# Patient Record
Sex: MALE | Race: WHITE | NOT HISPANIC OR LATINO | URBAN - METROPOLITAN AREA
[De-identification: names, ages, dates, MRNs, and addresses within clinical notes are randomized per-mention and may not be internally consistent; named-entity substitution may affect disease eponyms.]

---

## 2018-06-27 ENCOUNTER — IMPORTED ENCOUNTER (OUTPATIENT)
Dept: URBAN - METROPOLITAN AREA CLINIC 38 | Facility: CLINIC | Age: 68
End: 2018-06-27

## 2018-06-27 PROBLEM — H25.13 CATARACT (AGE RELATED) - NS (NUCLEAR) - OU: Noted: 2018-06-27

## 2018-06-27 PROCEDURE — 92015 DETERMINE REFRACTIVE STATE: CPT

## 2018-06-27 PROCEDURE — 92004 COMPRE OPH EXAM NEW PT 1/>: CPT

## 2018-12-18 PROBLEM — Z00.00 ENCOUNTER FOR PREVENTIVE HEALTH EXAMINATION: Noted: 2018-12-18

## 2019-01-16 PROBLEM — J45.909 ASTHMA, CHRONIC: Status: ACTIVE | Noted: 2019-01-16

## 2019-01-16 PROBLEM — Z63.4 WIDOWED: Status: ACTIVE | Noted: 2019-01-16

## 2019-01-16 NOTE — REVIEW OF SYSTEMS
[Shortness Of Breath] : shortness of breath [Dyspnea on exertion] : dyspnea during exertion [Joint Pain] : joint pain [Negative] : Heme/Lymph

## 2019-01-17 ENCOUNTER — NON-APPOINTMENT (OUTPATIENT)
Age: 69
End: 2019-01-17

## 2019-01-17 ENCOUNTER — APPOINTMENT (OUTPATIENT)
Dept: HEART AND VASCULAR | Facility: CLINIC | Age: 69
End: 2019-01-17
Payer: MEDICARE

## 2019-01-17 VITALS
HEART RATE: 67 BPM | OXYGEN SATURATION: 96 % | WEIGHT: 175 LBS | HEIGHT: 71 IN | DIASTOLIC BLOOD PRESSURE: 90 MMHG | SYSTOLIC BLOOD PRESSURE: 142 MMHG | BODY MASS INDEX: 24.5 KG/M2

## 2019-01-17 VITALS — SYSTOLIC BLOOD PRESSURE: 146 MMHG | DIASTOLIC BLOOD PRESSURE: 85 MMHG

## 2019-01-17 DIAGNOSIS — J45.909 UNSPECIFIED ASTHMA, UNCOMPLICATED: ICD-10-CM

## 2019-01-17 DIAGNOSIS — Z63.4 DISAPPEARANCE AND DEATH OF FAMILY MEMBER: ICD-10-CM

## 2019-01-17 PROCEDURE — 99213 OFFICE O/P EST LOW 20 MIN: CPT | Mod: 25

## 2019-01-17 PROCEDURE — 93000 ELECTROCARDIOGRAM COMPLETE: CPT

## 2019-01-17 RX ORDER — CHOLECALCIFEROL (VITAMIN D3) 125 MCG
50 MCG TABLET ORAL DAILY
Refills: 0 | Status: ACTIVE | COMMUNITY

## 2019-01-17 SDOH — SOCIAL STABILITY - SOCIAL INSECURITY: DISSAPEARANCE AND DEATH OF FAMILY MEMBER: Z63.4

## 2019-01-17 NOTE — PHYSICAL EXAM
[General Appearance - Well Developed] : well developed [Normal Appearance] : normal appearance [Well Groomed] : well groomed [General Appearance - Well Nourished] : well nourished [No Deformities] : no deformities [General Appearance - In No Acute Distress] : no acute distress [Normal Conjunctiva] : the conjunctiva exhibited no abnormalities [Eyelids - No Xanthelasma] : the eyelids demonstrated no xanthelasmas [Normal Oral Mucosa] : normal oral mucosa [No Oral Pallor] : no oral pallor [No Oral Cyanosis] : no oral cyanosis [Normal Jugular Venous A Waves Present] : normal jugular venous A waves present [Normal Jugular Venous V Waves Present] : normal jugular venous V waves present [No Jugular Venous Sultana A Waves] : no jugular venous sultana A waves [Respiration, Rhythm And Depth] : normal respiratory rhythm and effort [Exaggerated Use Of Accessory Muscles For Inspiration] : no accessory muscle use [Auscultation Breath Sounds / Voice Sounds] : lungs were clear to auscultation bilaterally [Normal Rate] : normal [Normal S1] : normal S1 [Normal S2] : normal S2 [No Murmur] : no murmurs heard [2+] : left 2+ [No Abnormalities] : the abdominal aorta was not enlarged and no bruit was heard [No Pitting Edema] : no pitting edema present [Abdomen Soft] : soft [Abdomen Tenderness] : non-tender [Abdomen Mass (___ Cm)] : no abdominal mass palpated [Abnormal Walk] : normal gait [Gait - Sufficient For Exercise Testing] : the gait was sufficient for exercise testing [Nail Clubbing] : no clubbing of the fingernails [Cyanosis, Localized] : no localized cyanosis [Petechial Hemorrhages (___cm)] : no petechial hemorrhages [Skin Color & Pigmentation] : normal skin color and pigmentation [] : no rash [No Venous Stasis] : no venous stasis [Skin Lesions] : no skin lesions [No Skin Ulcers] : no skin ulcer [No Xanthoma] : no  xanthoma was observed [Oriented To Time, Place, And Person] : oriented to person, place, and time [Affect] : the affect was normal [Mood] : the mood was normal [No Anxiety] : not feeling anxious [FreeTextEntry1] : + overweight [S3] : no S3 [S4] : no S4 [Right Carotid Bruit] : no bruit heard over the right carotid [Left Carotid Bruit] : no bruit heard over the left carotid [Right Femoral Bruit] : no bruit heard over the right femoral artery [Left Femoral Bruit] : no bruit heard over the left femoral artery

## 2019-01-17 NOTE — HISTORY OF PRESENT ILLNESS
[FreeTextEntry1] : Mr. Lainez presents for follow up and management of HTN and dyslipidemia. He denies any episodes of light headedness or syncope. His ECG reveals a right bundle branch block. He denies chest pain. He had an echocardiogram on 06/11/15 which revealed an EF of 57% and grade I diastolic dysfunction. He has a history of lumbar disc disease and underwent spinal surgery with francie insertion in '99 and now has complaints of low back pain and would like to see a spine specialist. He has complaints of progressive ROMAN and was evaluated by a pulmonologist at [facility] who ordered a chest CT which revealed a hiatal hernia but no obvious pulmonary parenchymal problem. He was asked to have a work up for a cardiac etiology of his ROMAN. He had an exercise stress echocardiogram on 10/12/16 which revealed an EF of 60% with normal wall motion and PA pressures post 7 METS of exercise but did reveal marked exertional dyspnea.  He was evaluated by a vascular surgeon, Dr. Restrepo, at [facility], and had a left lower extremity sonogram and was prescribed a compression stockings.  He moved out to [place], [place].  He denies recurrent CV symptoms.

## 2019-01-17 NOTE — REASON FOR VISIT
[Follow-Up - Clinic] : a clinic follow-up of [FreeTextEntry1] : Diagnostic Tests:\par ----------------------------------------------------\par ECG: \par 01/17/19: NSR, RBBB. \par 09/12/18: NSR, RBBB. \par 07/06/17: NSR, RBBB.\par 06/01/16: NSR, RBBB. \par 06/11/15: NSR, RBBB. \par 03/04/13: NSR, normal ECG.\par ----------------------------------------------------\par Echo: \par 09/27/18: EF 60%, normal echo. \par 06/11/15: EF 57%, grade I diastolic dysfunction, mild-moderate PI\par 07/22/08: EF 67%, LVH, trace MR/TR\par -----------------------------------------------------\par Stress: \par 10/12/16: exercise stress echo: EF 60%, normal wall motion and PA pressures. \par 11/23/15: exercise stress echo: EF 60%, normal wall motion and PA pressures. \par 09/29/09: exercise stress echo: EF 65%, normal wall motion and ECG post 13 METS\par -----------------------------------------------------\par Carotid:\par 11/23/15: sono; b/l prox ICA 1-19%.

## 2019-01-17 NOTE — ASSESSMENT
[FreeTextEntry1] :  \par 1. HTN: BP not at ACC/AHA 2017 guideline target, off medication this am\par             - continue candesartan 16mg po daily\par             - if BP remains above target next visit will up titrate anti-HTN regimen\par \par 2. TIA: in 2000, carotid artery stenosis, b/l prox ICA 1-19% (11/29/2015)\par             - continue aggressive medical therapy\par             - will follow with periodic sonograms. \par \par 3. Lumbar degenerative disc disease: Lumbar disc disease: s/p spinal surgery with francie insertion '99\par             - follow up with spine specialist Baron Vannessa MD. \par \par 4. Obstructive sleep apnea and asthma: moderate not on CPAP\par             - patient encouraged to meet with sleep specialist to discuss alternatives to traditional face mask CPAP\par             - discussed with patient importance of compliance with asthma medications. \par \par 5. Mild intermittent asthma, uncomplicated \par             - continue Montelukast Sodium Tablet, 10 MG, 1 tablet, Orally, Once a day, 90 days, 90\par             - continue Advair Diskus Aerosol Powder Breath Activated, 500-50 MCG/DOSE, 1 puff, Inhalation, Twice a day\par             - continue ProAir HFA Aerosol Solution, 108 (90 Base) MCG/ACT, 2 puffs as needed, Inhalation, every 4 hrs\par \par 6. Syncope: 2 episodes of near syncope:\par             - will send for an echocardiogram \par             - will order a mobile cardiac telemetry monitor (prefers patch)\par \par 7. Hypercholesteremia \par             - continue Crestor 40mg po daily\par             - discussed with patient therapeutic lifestyle changes to improve lipid metabolism.\par             - check lab work today\par \par 8. Symptomatic varicose veins: \par             - patient will provide a copy of LREV sono report from vascular surgeon.

## 2019-01-18 LAB
ALBUMIN SERPL ELPH-MCNC: 4.3 G/DL
ALP BLD-CCNC: 53 U/L
ALT SERPL-CCNC: 14 U/L
ANION GAP SERPL CALC-SCNC: 14 MMOL/L
AST SERPL-CCNC: 23 U/L
BASOPHILS # BLD AUTO: 0.06 K/UL
BASOPHILS NFR BLD AUTO: 0.9 %
BILIRUB SERPL-MCNC: 1 MG/DL
BUN SERPL-MCNC: 15 MG/DL
CALCIUM SERPL-MCNC: 9.4 MG/DL
CHLORIDE SERPL-SCNC: 102 MMOL/L
CHOLEST SERPL-MCNC: 135 MG/DL
CHOLEST/HDLC SERPL: 2.1 RATIO
CO2 SERPL-SCNC: 23 MMOL/L
CREAT SERPL-MCNC: 1.2 MG/DL
EOSINOPHIL # BLD AUTO: 0.26 K/UL
EOSINOPHIL NFR BLD AUTO: 4 %
GLUCOSE SERPL-MCNC: 75 MG/DL
HBA1C MFR BLD HPLC: 5.6 %
HCT VFR BLD CALC: 44.3 %
HDLC SERPL-MCNC: 64 MG/DL
HGB BLD-MCNC: 14.3 G/DL
IMM GRANULOCYTES NFR BLD AUTO: 0.2 %
LDLC SERPL CALC-MCNC: 54 MG/DL
LDLC SERPL DIRECT ASSAY-MCNC: 60 MG/DL
LYMPHOCYTES # BLD AUTO: 1.16 K/UL
LYMPHOCYTES NFR BLD AUTO: 18 %
MAN DIFF?: NORMAL
MCHC RBC-ENTMCNC: 30.7 PG
MCHC RBC-ENTMCNC: 32.3 GM/DL
MCV RBC AUTO: 95.1 FL
MONOCYTES # BLD AUTO: 0.47 K/UL
MONOCYTES NFR BLD AUTO: 7.3 %
NEUTROPHILS # BLD AUTO: 4.47 K/UL
NEUTROPHILS NFR BLD AUTO: 69.6 %
PLATELET # BLD AUTO: 197 K/UL
POTASSIUM SERPL-SCNC: 4.5 MMOL/L
PROT SERPL-MCNC: 6.9 G/DL
RBC # BLD: 4.66 M/UL
RBC # FLD: 14 %
SODIUM SERPL-SCNC: 139 MMOL/L
TRIGL SERPL-MCNC: 84 MG/DL
TSH SERPL-ACNC: 2.16 UIU/ML
WBC # FLD AUTO: 6.43 K/UL

## 2019-04-18 ENCOUNTER — APPOINTMENT (OUTPATIENT)
Dept: HEART AND VASCULAR | Facility: CLINIC | Age: 69
End: 2019-04-18
Payer: MEDICARE

## 2019-04-18 VITALS
TEMPERATURE: 97.6 F | WEIGHT: 184 LBS | HEART RATE: 73 BPM | HEIGHT: 71 IN | SYSTOLIC BLOOD PRESSURE: 122 MMHG | OXYGEN SATURATION: 97 % | DIASTOLIC BLOOD PRESSURE: 80 MMHG | BODY MASS INDEX: 25.76 KG/M2

## 2019-04-18 DIAGNOSIS — M87.00 IDIOPATHIC ASEPTIC NECROSIS OF UNSPECIFIED BONE: ICD-10-CM

## 2019-04-18 PROCEDURE — 99214 OFFICE O/P EST MOD 30 MIN: CPT

## 2019-04-18 NOTE — HISTORY OF PRESENT ILLNESS
[FreeTextEntry1] : Mr. Lainez presents for follow up and management of HTN, syncope, RBBB, and dyslipidemia. He denies any episodes of light headedness or syncope. His ECG reveals a right bundle branch block. He denies chest pain. He had an echocardiogram on 06/11/15 which revealed an EF of 57% and grade I diastolic dysfunction. He has a history of lumbar disc disease and underwent spinal surgery with francie insertion in '99 and now has complaints of low back pain and would like to see a spine specialist. He has complaints of progressive ROMAN and was evaluated by a pulmonologist at [facility] who ordered a chest CT which revealed a hiatal hernia but no obvious pulmonary parenchymal problem. He was asked to have a work up for a cardiac etiology of his ROMAN. He had an exercise stress echocardiogram on 10/12/16 which revealed an EF of 60% with normal wall motion and PA pressures post 7 METS of exercise but did reveal marked exertional dyspnea.  He was evaluated by a vascular surgeon, Dr. Restrepo, at [facility], and had a left lower extremity sonogram and was prescribed a compression stockings.  He moved out to [place], [place].  He denies recurrent CV symptoms. At present, he has joined a gym and is exercising regularly.

## 2019-04-18 NOTE — REVIEW OF SYSTEMS
[Shortness Of Breath] : shortness of breath [Dyspnea on exertion] : dyspnea during exertion [Joint Pain] : joint pain [Negative] : Endocrine

## 2019-04-18 NOTE — PHYSICAL EXAM
[General Appearance - Well Developed] : well developed [Well Groomed] : well groomed [Normal Appearance] : normal appearance [General Appearance - Well Nourished] : well nourished [No Deformities] : no deformities [General Appearance - In No Acute Distress] : no acute distress [Eyelids - No Xanthelasma] : the eyelids demonstrated no xanthelasmas [Normal Conjunctiva] : the conjunctiva exhibited no abnormalities [No Oral Pallor] : no oral pallor [Normal Oral Mucosa] : normal oral mucosa [No Oral Cyanosis] : no oral cyanosis [Normal Jugular Venous A Waves Present] : normal jugular venous A waves present [Normal Jugular Venous V Waves Present] : normal jugular venous V waves present [No Jugular Venous Sultana A Waves] : no jugular venous sultana A waves [Exaggerated Use Of Accessory Muscles For Inspiration] : no accessory muscle use [Respiration, Rhythm And Depth] : normal respiratory rhythm and effort [Auscultation Breath Sounds / Voice Sounds] : lungs were clear to auscultation bilaterally [Abdomen Soft] : soft [Abdomen Tenderness] : non-tender [Abdomen Mass (___ Cm)] : no abdominal mass palpated [Abnormal Walk] : normal gait [Gait - Sufficient For Exercise Testing] : the gait was sufficient for exercise testing [Cyanosis, Localized] : no localized cyanosis [Nail Clubbing] : no clubbing of the fingernails [Petechial Hemorrhages (___cm)] : no petechial hemorrhages [] : no rash [Skin Color & Pigmentation] : normal skin color and pigmentation [Skin Lesions] : no skin lesions [No Venous Stasis] : no venous stasis [No Skin Ulcers] : no skin ulcer [No Xanthoma] : no  xanthoma was observed [Affect] : the affect was normal [Oriented To Time, Place, And Person] : oriented to person, place, and time [No Anxiety] : not feeling anxious [Mood] : the mood was normal [Normal Rate] : normal [Normal S1] : normal S1 [Normal S2] : normal S2 [No Murmur] : no murmurs heard [2+] : left 2+ [No Pitting Edema] : no pitting edema present [No Abnormalities] : the abdominal aorta was not enlarged and no bruit was heard [FreeTextEntry1] : + overweight [S3] : no S3 [Right Carotid Bruit] : no bruit heard over the right carotid [S4] : no S4 [Left Carotid Bruit] : no bruit heard over the left carotid [Right Femoral Bruit] : no bruit heard over the right femoral artery [Left Femoral Bruit] : no bruit heard over the left femoral artery

## 2019-04-18 NOTE — ASSESSMENT
[FreeTextEntry1] :  \par 1. HTN: BP at ACC/AHA 2017 guideline target, off medication this am\par             - continue candesartan 16mg po daily\par \par 2. TIA: in 2000, carotid artery stenosis, b/l prox ICA 1-19% (11/29/2015)\par             - continue aggressive medical therapy\par             - will follow with periodic sonograms. \par \par 3. Lumbar degenerative disc disease: Lumbar disc disease: s/p spinal surgery with francie insertion '99\par             - follow up with spine specialist Baron Vannessa MD. \par \par 4. Obstructive sleep apnea and asthma: moderate not on CPAP\par             - patient encouraged to meet with sleep specialist to discuss alternatives to traditional face mask CPAP\par             - discussed with patient importance of compliance with asthma medications. \par \par 5. Mild intermittent asthma, uncomplicated \par             - continue Montelukast Sodium Tablet, 10 MG, 1 tablet, Orally, Once a day, 90 days, 90\par             - continue Advair Diskus, 500-50 MCG/DOSE, 1 puff, Inhalation, Twice a day\par             - continue ProAir HFA, 108 (90 Base) MCG/ACT, 2 puffs as needed, Inhalation, every 4 hrs\par \par 6. Syncope: 2 episodes of near syncope:\par             - will send for an echocardiogram \par             - will order a mobile cardiac telemetry monitor (prefers patch)\par \par 7. Hypercholesteremia: lipids optimal\par             - continue Crestor 40mg po daily\par             - discussed with patient therapeutic lifestyle changes to improve lipid metabolism.\par \par 8. Symptomatic varicose veins: \par             - patient will provide a copy of LREV sono report from vascular surgeon.

## 2019-04-18 NOTE — REASON FOR VISIT
[Follow-Up - Clinic] : a clinic follow-up of [FreeTextEntry1] : HTN, dyslipidemia, and right bundle branch block

## 2019-05-07 ENCOUNTER — MEDICATION RENEWAL (OUTPATIENT)
Age: 69
End: 2019-05-07

## 2019-06-26 ENCOUNTER — IMPORTED ENCOUNTER (OUTPATIENT)
Dept: URBAN - METROPOLITAN AREA CLINIC 38 | Facility: CLINIC | Age: 69
End: 2019-06-26

## 2019-06-26 PROBLEM — H25.13 CATARACT (AGE RELATED) - NS (NUCLEAR) - OU: Noted: 2019-06-26

## 2019-06-26 PROCEDURE — 92014 COMPRE OPH EXAM EST PT 1/>: CPT

## 2019-07-18 ENCOUNTER — APPOINTMENT (OUTPATIENT)
Dept: HEART AND VASCULAR | Facility: CLINIC | Age: 69
End: 2019-07-18

## 2019-09-10 ENCOUNTER — APPOINTMENT (OUTPATIENT)
Dept: HEART AND VASCULAR | Facility: CLINIC | Age: 69
End: 2019-09-10
Payer: MEDICARE

## 2019-09-10 VITALS
WEIGHT: 209 LBS | HEIGHT: 71 IN | DIASTOLIC BLOOD PRESSURE: 84 MMHG | OXYGEN SATURATION: 97 % | HEART RATE: 71 BPM | SYSTOLIC BLOOD PRESSURE: 134 MMHG | BODY MASS INDEX: 29.26 KG/M2

## 2019-09-10 PROCEDURE — 93306 TTE W/DOPPLER COMPLETE: CPT

## 2019-09-10 PROCEDURE — 99214 OFFICE O/P EST MOD 30 MIN: CPT | Mod: 25

## 2019-09-10 NOTE — HISTORY OF PRESENT ILLNESS
[FreeTextEntry1] : Mr. Lainez presents for follow up and management of HTN, syncope, RBBB, and dyslipidemia. He has a history of lumbar disc disease and underwent spinal surgery with francie insertion in '99  He had complaints of progressive ROMAN and was evaluated by a pulmonologist at [facility] who ordered a chest CT which revealed a hiatal hernia but no obvious pulmonary parenchymal problem. He was asked to have a work up for a cardiac etiology of his ROMAN. He had an exercise stress echocardiogram on 10/12/16 which revealed an EF of 60% with normal wall motion and PA pressures post 7 METS of exercise but did reveal marked exertional dyspnea.  He was evaluated by a vascular surgeon, Dr. Restrepo, at [facility], and had a left lower extremity sonogram and was prescribed a compression stockings.  He moved out to [place], [place].  He denies recurrent CV symptoms. He had an echocardiogram today (09/10/19) which revealed an EF of 69% and was a normal study.  At present, he has joined a gym and is exercising regularly. ETT has improved and has an exercise regimen.Denies any chest pain, SOB/ROMAN, palpitatio

## 2019-09-10 NOTE — REASON FOR VISIT
[Follow-Up - Clinic] : a clinic follow-up of [FreeTextEntry1] : Diagnostic Tests:\par ----------------------------------------------------\par ECG: \par 01/17/19: NSR, RBBB. \par 09/12/18: NSR, RBBB. \par 07/06/17: NSR, RBBB.\par 06/01/16: NSR, RBBB. \par 06/11/15: NSR, RBBB. \par 03/04/13: NSR, normal ECG.\par ----------------------------------------------------\par Echo: \par 09/10/19: EF 69%, normal echo. \par 09/27/18: EF 60%, normal echo. \par 06/11/15: EF 57%, grade I diastolic dysfunction, mild-moderate PI\par 07/22/08: EF 67%, LVH, trace MR/TR\par -----------------------------------------------------\par Stress: \par 10/12/16: exercise stress echo: EF 60%, normal wall motion and PA pressures. \par 11/23/15: exercise stress echo: EF 60%, normal wall motion and PA pressures. \par 09/29/09: exercise stress echo: EF 65%, normal wall motion and ECG post 13 METS\par -----------------------------------------------------\par Carotid:\par 11/23/15: sono; b/l prox ICA 1-19%.

## 2019-09-10 NOTE — REVIEW OF SYSTEMS
[Negative] : Heme/Lymph [Shortness Of Breath] : no shortness of breath [Dyspnea on exertion] : not dyspnea during exertion [Joint Pain] : no joint pain

## 2019-09-10 NOTE — PHYSICAL EXAM
[General Appearance - Well Developed] : well developed [Normal Appearance] : normal appearance [Well Groomed] : well groomed [General Appearance - Well Nourished] : well nourished [No Deformities] : no deformities [General Appearance - In No Acute Distress] : no acute distress [Normal Conjunctiva] : the conjunctiva exhibited no abnormalities [Eyelids - No Xanthelasma] : the eyelids demonstrated no xanthelasmas [Normal Oral Mucosa] : normal oral mucosa [No Oral Pallor] : no oral pallor [No Oral Cyanosis] : no oral cyanosis [Normal Jugular Venous A Waves Present] : normal jugular venous A waves present [Normal Jugular Venous V Waves Present] : normal jugular venous V waves present [No Jugular Venous Sultana A Waves] : no jugular venous sultana A waves [Respiration, Rhythm And Depth] : normal respiratory rhythm and effort [Exaggerated Use Of Accessory Muscles For Inspiration] : no accessory muscle use [Auscultation Breath Sounds / Voice Sounds] : lungs were clear to auscultation bilaterally [Abdomen Soft] : soft [Abdomen Tenderness] : non-tender [Abdomen Mass (___ Cm)] : no abdominal mass palpated [Abnormal Walk] : normal gait [Gait - Sufficient For Exercise Testing] : the gait was sufficient for exercise testing [Nail Clubbing] : no clubbing of the fingernails [Cyanosis, Localized] : no localized cyanosis [Petechial Hemorrhages (___cm)] : no petechial hemorrhages [Skin Color & Pigmentation] : normal skin color and pigmentation [] : no rash [No Venous Stasis] : no venous stasis [Skin Lesions] : no skin lesions [No Skin Ulcers] : no skin ulcer [No Xanthoma] : no  xanthoma was observed [Oriented To Time, Place, And Person] : oriented to person, place, and time [Affect] : the affect was normal [Mood] : the mood was normal [No Anxiety] : not feeling anxious [Normal Rate] : normal [Normal S1] : normal S1 [Normal S2] : normal S2 [No Murmur] : no murmurs heard [2+] : left 2+ [No Abnormalities] : the abdominal aorta was not enlarged and no bruit was heard [No Pitting Edema] : no pitting edema present [FreeTextEntry1] : + overweight [S3] : no S3 [S4] : no S4 [Right Carotid Bruit] : no bruit heard over the right carotid [Left Carotid Bruit] : no bruit heard over the left carotid [Right Femoral Bruit] : no bruit heard over the right femoral artery [Left Femoral Bruit] : no bruit heard over the left femoral artery

## 2019-09-10 NOTE — ASSESSMENT
[FreeTextEntry1] :  \par 1. HTN: BP at ACC/AHA 2017 guideline target, off medication this am\par             - continue candesartan 16mg po daily\par \par 2. TIA: in 2000, carotid artery stenosis, b/l prox ICA 1-19% (11/29/2015)\par             - continue aggressive medical therapy\par             - will follow with periodic sonograms. \par \par 3. Lumbar degenerative disc disease: Lumbar disc disease: s/p spinal surgery with francie insertion '99\par             - follow up with spine specialist Baron Vannessa MD. \par \par 4. Obstructive sleep apnea and asthma: moderate not on CPAP\par             - patient encouraged to meet with sleep specialist to discuss alternatives to traditional face mask CPAP\par             - discussed with patient importance of compliance with asthma medications. \par \par 5. Mild intermittent asthma, uncomplicated \par             - continue Montelukast Sodium Tablet, 10 MG, 1 tablet, Orally, Once a day, 90 days, 90\par             - continue Advair Diskus, 500-50 MCG/DOSE, 1 puff, Inhalation, Twice a day\par             - continue ProAir HFA, 108 (90 Base) MCG/ACT, 2 puffs as needed, Inhalation, every 4 hrs\par \par 6. Syncope: 2 episodes of near syncope:\par             - will send for an echocardiogram \par             - will order a mobile cardiac telemetry monitor (prefers patch)\par \par 7. Hypercholesteremia: lipids optimal\par             - continue Crestor 40mg po daily\par             - discussed with patient therapeutic lifestyle changes to improve lipid metabolism.\par \par 8. Symptomatic varicose veins: \par             - will send for b/l LEV sonogram\par \par 9. DVT: RLE: s/p spine surgery\par             - will send for b/l LEV sonogram

## 2019-09-19 ENCOUNTER — RX RENEWAL (OUTPATIENT)
Age: 69
End: 2019-09-19

## 2019-12-10 ENCOUNTER — APPOINTMENT (OUTPATIENT)
Dept: HEART AND VASCULAR | Facility: CLINIC | Age: 69
End: 2019-12-10
Payer: MEDICARE

## 2019-12-10 ENCOUNTER — NON-APPOINTMENT (OUTPATIENT)
Age: 69
End: 2019-12-10

## 2019-12-10 VITALS
DIASTOLIC BLOOD PRESSURE: 82 MMHG | BODY MASS INDEX: 29.96 KG/M2 | SYSTOLIC BLOOD PRESSURE: 131 MMHG | OXYGEN SATURATION: 96 % | HEIGHT: 71 IN | HEART RATE: 63 BPM | WEIGHT: 214 LBS

## 2019-12-10 PROCEDURE — 99214 OFFICE O/P EST MOD 30 MIN: CPT | Mod: 25

## 2019-12-10 PROCEDURE — 93970 EXTREMITY STUDY: CPT | Mod: 59

## 2019-12-10 PROCEDURE — 93000 ELECTROCARDIOGRAM COMPLETE: CPT

## 2019-12-10 RX ORDER — ALBUTEROL SULFATE 90 UG/1
108 (90 BASE) AEROSOL, METERED RESPIRATORY (INHALATION) EVERY 4 HOURS
Qty: 3 | Refills: 3 | Status: ACTIVE | COMMUNITY
Start: 1900-01-01 | End: 1900-01-01

## 2019-12-10 NOTE — REVIEW OF SYSTEMS
[Joint Pain] : no joint pain [Shortness Of Breath] : no shortness of breath [Dyspnea on exertion] : not dyspnea during exertion

## 2019-12-10 NOTE — REASON FOR VISIT
[FreeTextEntry1] : Diagnostic Tests:\par ----------------------------------------------------\par ECG: \par 12/10/19: sinus bradycardia at 59, RBBB, diffuse T-wave abnormality.\par 01/17/19: NSR, RBBB. \par 09/12/18: NSR, RBBB. \par 07/06/17: NSR, RBBB.\par 06/01/16: NSR, RBBB. \par 06/11/15: NSR, RBBB. \par 03/04/13: NSR, normal ECG.\par ----------------------------------------------------\par Echo: \par 09/10/19: EF 69%, normal echo. \par 09/27/18: EF 60%, normal echo. \par 06/11/15: EF 57%, grade I diastolic dysfunction, mild-moderate PI\par 07/22/08: EF 67%, LVH, trace MR/TR\par -----------------------------------------------------\par Stress: \par 10/12/16: exercise stress echo: EF 60%, normal wall motion and PA pressures. \par 11/23/15: exercise stress echo: EF 60%, normal wall motion and PA pressures. \par 09/29/09: exercise stress echo: EF 65%, normal wall motion and ECG post 13 METS\par -----------------------------------------------------\par Carotid:\par 11/23/15: sono; b/l prox ICA 1-19%.\par -----------------------------------------------------\par Lower extremity veins:\par 12/10/19: sono: + chronic nonocclusive DVT rPOP vein, + chronic nonocclusive SVT rPAV, + right varicose veins, left normal.

## 2019-12-10 NOTE — HISTORY OF PRESENT ILLNESS
[FreeTextEntry1] : Mr. Lainez presents for follow up and management of HTN, syncope, RBBB, and dyslipidemia. He has a history of lumbar disc disease and underwent spinal surgery with francie insertion in '99  He had complaints of progressive ROMAN and was evaluated by a pulmonologist at [facility] who ordered a chest CT which revealed a hiatal hernia but no obvious pulmonary parenchymal problem. He was asked to have a work up for a cardiac etiology of his ROMAN. He had an exercise stress echocardiogram on 10/12/16 which revealed an EF of 60% with normal wall motion and PA pressures post 7 METS of exercise but did reveal marked exertional dyspnea.  He was evaluated by a vascular surgeon, Dr. Restrepo, at [facility], and had a left lower extremity sonogram and was prescribed a compression stockings.  He moved out to [place], [place].  He denies recurrent CV symptoms. He had an echocardiogram on 09/10/19 which revealed an EF of 69% and was a normal study.  At present, he has joined a gym and is exercising regularly but we discussed the importance of adding cardiovascular exercise to his regimen.

## 2019-12-10 NOTE — ASSESSMENT
[FreeTextEntry1] : 1. HTN: BP at ACC/AHA 2017 guideline target\par             - continue candesartan 16mg po daily\par \par 2. TIA: in 2000, carotid artery stenosis, b/l prox ICA 1-19% (11/29/2015)\par             - continue aggressive medical therapy\par             - will follow with periodic sonograms. \par \par 3. Lumbar degenerative disc disease: Lumbar disc disease: s/p spinal surgery with francie insertion '99\par             - follow up with spine specialist Baron Vannessa MD. \par \par 4. Obstructive sleep apnea and asthma: moderate not on CPAP\par             - patient encouraged to meet with sleep specialist to discuss alternatives to traditional face mask CPAP\par             - discussed with patient importance of compliance with asthma medications. \par \par 5. Mild intermittent asthma, uncomplicated \par             - continue Montelukast Sodium Tablet, 10 MG, 1 tablet, Orally, Once a day, 90 days, 90\par             - continue Advair Diskus, 500-50 MCG/DOSE, 1 puff, Inhalation, Twice a day\par             - continue ProAir HFA, 108 (90 Base) MCG/ACT, 2 puffs as needed, Inhalation, every 4 hrs\par \par 6. Syncope: 2 episodes of near syncope:\par             - will send for an echocardiogram \par             - will order a mobile cardiac telemetry monitor (prefers patch)\par \par 7. Hypercholesteremia: lipids optimal\par             - continue Crestor 40mg po daily\par             - discussed with patient therapeutic lifestyle changes to improve lipid metabolism.\par \par 8. DVT s/p spinal surgery in the past, symptomatic varicose veins: s/p 12/10/19: sono: + chronic nonocclusive DVT rPOP vein, + chronic nonocclusive SVT rPAV, + right varicose veins, left normal. \par             - will follow with serial sonograms\par             - continue preventive measures\par \par 9. + Weight gain: \par           -we had an extensive discussion about therapeutic lifestyle changes to promote increased cardiovascular fitness and achieving goal weight.\par

## 2019-12-10 NOTE — PHYSICAL EXAM
[FreeTextEntry1] : + overweight [S4] : no S4 [Right Carotid Bruit] : no bruit heard over the right carotid [S3] : no S3 [Left Carotid Bruit] : no bruit heard over the left carotid [Right Femoral Bruit] : no bruit heard over the right femoral artery [Left Femoral Bruit] : no bruit heard over the left femoral artery

## 2019-12-11 LAB
24R-OH-CALCIDIOL SERPL-MCNC: 47.2 PG/ML
ALBUMIN SERPL ELPH-MCNC: 4.1 G/DL
ALP BLD-CCNC: 58 U/L
ALT SERPL-CCNC: 13 U/L
ANION GAP SERPL CALC-SCNC: 11 MMOL/L
AST SERPL-CCNC: 14 U/L
BASOPHILS # BLD AUTO: 0.06 K/UL
BASOPHILS NFR BLD AUTO: 0.9 %
BILIRUB SERPL-MCNC: 0.7 MG/DL
BUN SERPL-MCNC: 17 MG/DL
CALCIUM SERPL-MCNC: 9.3 MG/DL
CHLORIDE SERPL-SCNC: 105 MMOL/L
CHOLEST SERPL-MCNC: 148 MG/DL
CHOLEST/HDLC SERPL: 2 RATIO
CO2 SERPL-SCNC: 25 MMOL/L
CREAT SERPL-MCNC: 1.21 MG/DL
EOSINOPHIL # BLD AUTO: 0.19 K/UL
EOSINOPHIL NFR BLD AUTO: 3 %
ESTIMATED AVERAGE GLUCOSE: 111 MG/DL
GLUCOSE SERPL-MCNC: 109 MG/DL
HBA1C MFR BLD HPLC: 5.5 %
HCT VFR BLD CALC: 43.9 %
HDLC SERPL-MCNC: 73 MG/DL
HGB BLD-MCNC: 14.2 G/DL
IMM GRANULOCYTES NFR BLD AUTO: 0.3 %
LDLC SERPL CALC-MCNC: 54 MG/DL
LDLC SERPL DIRECT ASSAY-MCNC: 67 MG/DL
LYMPHOCYTES # BLD AUTO: 1.1 K/UL
LYMPHOCYTES NFR BLD AUTO: 17.4 %
MAN DIFF?: NORMAL
MCHC RBC-ENTMCNC: 31.1 PG
MCHC RBC-ENTMCNC: 32.3 GM/DL
MCV RBC AUTO: 96.3 FL
MONOCYTES # BLD AUTO: 0.48 K/UL
MONOCYTES NFR BLD AUTO: 7.6 %
NEUTROPHILS # BLD AUTO: 4.49 K/UL
NEUTROPHILS NFR BLD AUTO: 70.8 %
PLATELET # BLD AUTO: 227 K/UL
POTASSIUM SERPL-SCNC: 4.3 MMOL/L
PROT SERPL-MCNC: 6.5 G/DL
RBC # BLD: 4.56 M/UL
RBC # FLD: 13.1 %
SODIUM SERPL-SCNC: 141 MMOL/L
TRIGL SERPL-MCNC: 106 MG/DL
TSH SERPL-ACNC: 1.93 UIU/ML
WBC # FLD AUTO: 6.34 K/UL

## 2020-04-07 ENCOUNTER — APPOINTMENT (OUTPATIENT)
Dept: HEART AND VASCULAR | Facility: CLINIC | Age: 70
End: 2020-04-07

## 2020-08-12 ENCOUNTER — IMPORTED ENCOUNTER (OUTPATIENT)
Dept: URBAN - METROPOLITAN AREA CLINIC 38 | Facility: CLINIC | Age: 70
End: 2020-08-12

## 2020-08-12 PROBLEM — H25.13 CATARACT (AGE RELATED) - NS (NUCLEAR) - OU: Noted: 2020-08-12

## 2020-08-12 PROCEDURE — 92014 COMPRE OPH EXAM EST PT 1/>: CPT

## 2020-09-22 ENCOUNTER — RX RENEWAL (OUTPATIENT)
Age: 70
End: 2020-09-22

## 2020-12-14 ENCOUNTER — RX RENEWAL (OUTPATIENT)
Age: 70
End: 2020-12-14

## 2020-12-17 ENCOUNTER — APPOINTMENT (OUTPATIENT)
Dept: HEART AND VASCULAR | Facility: CLINIC | Age: 70
End: 2020-12-17
Payer: MEDICARE

## 2020-12-17 VITALS
SYSTOLIC BLOOD PRESSURE: 162 MMHG | WEIGHT: 214 LBS | BODY MASS INDEX: 29.96 KG/M2 | DIASTOLIC BLOOD PRESSURE: 82 MMHG | HEIGHT: 71 IN | HEART RATE: 85 BPM

## 2020-12-17 PROCEDURE — 99213 OFFICE O/P EST LOW 20 MIN: CPT

## 2020-12-17 NOTE — ASSESSMENT
[FreeTextEntry1] : 1. HTN: BP not at ACC/AHA 2017 guideline target\par             - continue candesartan 16mg po daily\par             - if BP remains above target next visit will up titrate anti-HTN regimen \par \par 2. TIA: in 2000, carotid artery stenosis, b/l prox ICA 1-19% (11/29/2015)\par             - continue aggressive medical therapy\par             - will follow with periodic sonograms. \par \par 3. Lumbar degenerative disc disease: Lumbar disc disease: s/p spinal surgery with francie insertion '99\par             - follow up with spine specialist Baron Vannessa MD \par \par 4. Obstructive sleep apnea and asthma: moderate not on CPAP\par             - patient encouraged to meet with sleep specialist to discuss alternatives to traditional face mask CPAP\par             - discussed with patient importance of compliance with asthma medications. \par \par 5. Mild intermittent asthma, uncomplicated \par             - continue Montelukast Sodium Tablet, 10 MG, 1 tablet, Orally, Once a day, 90 days, 90\par             - continue Advair Diskus, 500-50 MCG/DOSE, 1 puff, Inhalation, Twice a day\par             - continue ProAir HFA, 108 (90 Base) MCG/ACT, 2 puffs as needed, Inhalation, every 4 hrs\par \par 6. Syncope: 2 episodes of near syncope:\par             - will send for an echocardiogram \par             - will order a mobile cardiac telemetry monitor (prefers patch)\par \par 7. Hypercholesteremia: lipids optimal\par             - continue Crestor 40mg po daily\par             - discussed with patient therapeutic lifestyle changes to improve lipid metabolism\par             - patient will provide copy of recent lab work from PMD's office for my review.\par \par 8. DVT s/p spinal surgery in the past, symptomatic varicose veins: s/p 12/10/19: sono: + chronic nonocclusive DVT rPOP vein, + chronic nonocclusive SVT rPAV, + right varicose veins, left normal. \par             - will follow with serial sonograms\par             - continue preventive measures\par \par

## 2020-12-17 NOTE — HISTORY OF PRESENT ILLNESS
[FreeTextEntry1] : Mr. Lainez presents for follow up and management of HTN, syncope, RBBB, and dyslipidemia. He has a history of lumbar disc disease and underwent spinal surgery with francie insertion in '99  He had complaints of progressive ROMAN and was evaluated by a pulmonologist at [facility] who ordered a chest CT which revealed a hiatal hernia but no obvious pulmonary parenchymal problem. He was asked to have a work up for a cardiac etiology of his ROMAN. He had an exercise stress echocardiogram on 10/12/16 which revealed an EF of 60% with normal wall motion and PA pressures post 7 METS of exercise but did reveal marked exertional dyspnea.  He was evaluated by a vascular surgeon, Dr. Restrepo, at [facility], and had a left lower extremity sonogram and was prescribed a compression stockings.  He moved out to [place], [place].  He had an echocardiogram on 09/10/19 which revealed an EF of 69% and was a normal study.   On 05/07/20 he was admitted to a hospital in [place] with acute cholecystitis and underwent robotic assisted cholecystectomy.  At present, he is doing well and denies any cardiovascular complaints.

## 2020-12-17 NOTE — REVIEW OF SYSTEMS
[Shortness Of Breath] : no shortness of breath [Dyspnea on exertion] : not dyspnea during exertion [Joint Pain] : no joint pain

## 2021-02-25 ENCOUNTER — APPOINTMENT (OUTPATIENT)
Dept: HEART AND VASCULAR | Facility: CLINIC | Age: 71
End: 2021-02-25
Payer: MEDICARE

## 2021-02-25 VITALS
SYSTOLIC BLOOD PRESSURE: 166 MMHG | WEIGHT: 214 LBS | HEART RATE: 82 BPM | HEIGHT: 71 IN | BODY MASS INDEX: 29.96 KG/M2 | DIASTOLIC BLOOD PRESSURE: 92 MMHG

## 2021-02-25 DIAGNOSIS — N40.0 BENIGN PROSTATIC HYPERPLASIA WITHOUT LOWER URINARY TRACT SYMPMS: ICD-10-CM

## 2021-02-25 PROCEDURE — 99215 OFFICE O/P EST HI 40 MIN: CPT

## 2021-02-25 PROCEDURE — 99072 ADDL SUPL MATRL&STAF TM PHE: CPT

## 2021-02-25 NOTE — REASON FOR VISIT
[Follow-Up - Clinic] : a clinic follow-up of [FreeTextEntry1] : Diagnostic Tests:\par ----------------------------------------------------\par ECG: \par 12/10/19: sinus bradycardia at 59, RBBB, diffuse T-wave abnormality.\par 01/17/19: NSR, RBBB. \par 09/12/18: NSR, RBBB. \par 07/06/17: NSR, RBBB.\par 06/01/16: NSR, RBBB. \par 06/11/15: NSR, RBBB. \par 03/04/13: NSR, normal ECG.\par ----------------------------------------------------\par Echo: \par 09/10/19: EF 69%, normal echo. \par 09/27/18: EF 60%, normal echo. \par 06/11/15: EF 57%, grade I diastolic dysfunction, mild-moderate PI\par 07/22/08: EF 67%, LVH, trace MR/TR\par -----------------------------------------------------\par Stress: \par 10/12/16: exercise stress echo: EF 60%, normal wall motion and PA pressures. \par 11/23/15: exercise stress echo: EF 60%, normal wall motion and PA pressures. \par 09/29/09: exercise stress echo: EF 65%, normal wall motion and ECG post 13 METS\par -----------------------------------------------------\par Carotid:\par 11/23/15: sono; b/l prox ICA 1-19%.\par -----------------------------------------------------\par Lower extremity veins:\par 12/10/19: sono: + chronic nonocclusive DVT rPOP vein, + chronic nonocclusive SVT rPAV, + right varicose veins, left normal.

## 2021-02-25 NOTE — ASSESSMENT
[FreeTextEntry1] : 1. HTN: BP not at ACC/AHA 2017 guideline target: off medication this am\par             - continue candesartan 16mg po daily (will switch to the am)\par             - if BP remains above target next visit will up titrate anti-HTN regimen\par             - continue ambulatory BP log \par             - will send for an echocardiogram to r/o hypertensive heart disease\par \par 2. TIA: in 2000, carotid artery stenosis, b/l prox ICA 1-19% (11/29/2015)\par             - continue aggressive medical therapy\par             - will follow with periodic sonograms. \par \par 3. Lumbar degenerative disc disease: Lumbar disc disease: s/p spinal surgery with francie insertion '99\par             - follow up with spine specialist Baron Vannessa MD \par \par 4. Obstructive sleep apnea and asthma: moderate not on CPAP\par             - patient encouraged to meet with sleep specialist to discuss alternatives to traditional face mask CPAP\par             - discussed with patient importance of compliance with asthma medications. \par \par 5. Mild intermittent asthma, uncomplicated \par             - continue Montelukast Sodium Tablet, 10 MG, 1 tablet, Orally, Once a day, 90 days, 90\par             - continue Advair Diskus, 500-50 MCG/DOSE, 1 puff, Inhalation, Twice a day\par             - continue ProAir HFA, 108 (90 Base) MCG/ACT, 2 puffs as needed, Inhalation, every 4 hrs\par \par 6. Syncope: 2 episodes of near syncope:\par             - will send for an echocardiogram \par             - will order a mobile cardiac telemetry monitor (prefers patch)\par \par 7. Hypercholesteremia: lipids optimal\par             - continue Crestor 40mg po daily\par             - discussed with patient therapeutic lifestyle changes to improve lipid metabolism\par             - patient will provide copy of recent lab work from PMD's office for my review.\par \par 8. DVT s/p spinal surgery in the past, symptomatic varicose veins: s/p 12/10/19: sono: + chronic nonocclusive DVT rPOP vein, + chronic nonocclusive SVT rPAV, + right varicose veins, left normal. \par             - will follow with serial sonograms (ordered today) \par             - continue preventive measures\par \par \par Due to the current global COVID-19 pandemic and the recommendations for social distancing this encounter was converted from an in-person face-to-face encounter to a telehealth encounter employing the [employer], or other approved audio/video platform.  All components of the evaluation and management were performed per clinical routine with the exception of the physical exam.  The physical exam references my most recent physical exam plus any additional information provided by the patient (i.e. ambulatory vitals/weight) or inspection from the video portion of the encounter. \par \par I spent in excess of 40 minutes on the encounter.  \par \par Verbal consent was given on 02/25/21 by Juancho Lainez. \par \par Patient location: The patient was located at the primary address listed in the medical record.\par Physician location: I was located in my office in [facility]. \par \par

## 2021-02-25 NOTE — HISTORY OF PRESENT ILLNESS
[FreeTextEntry1] : Mr. Lainez presents for follow up and management of HTN, syncope, RBBB, and dyslipidemia. He has a history of lumbar disc disease and underwent spinal surgery with francie insertion in '99  He had complaints of progressive ROMAN and was evaluated by a pulmonologist at [facility] who ordered a chest CT which revealed a hiatal hernia but no obvious pulmonary parenchymal problem. He was asked to have a work up for a cardiac etiology of his ROMAN. He had an exercise stress echocardiogram on 10/12/16 which revealed an EF of 60% with normal wall motion and PA pressures post 7 METS of exercise but did reveal marked exertional dyspnea.  He was evaluated by a vascular surgeon, Dr. Restrepo, at [facility], and had a left lower extremity sonogram and was prescribed a compression stockings.  He moved out to [place], [place].  He had an echocardiogram on 09/10/19 which revealed an EF of 69% and was a normal study.   On 05/07/20 he was admitted to a hospital in [place] with acute cholecystitis and underwent robotic assisted cholecystectomy.  At present, he is doing well and denies any cardiovascular complaints.

## 2021-03-04 ENCOUNTER — APPOINTMENT (OUTPATIENT)
Dept: HEART AND VASCULAR | Facility: CLINIC | Age: 71
End: 2021-03-04
Payer: MEDICARE

## 2021-03-04 ENCOUNTER — NON-APPOINTMENT (OUTPATIENT)
Age: 71
End: 2021-03-04

## 2021-03-04 VITALS
BODY MASS INDEX: 29.4 KG/M2 | HEIGHT: 71 IN | OXYGEN SATURATION: 97 % | HEART RATE: 67 BPM | SYSTOLIC BLOOD PRESSURE: 135 MMHG | TEMPERATURE: 97.5 F | DIASTOLIC BLOOD PRESSURE: 74 MMHG | WEIGHT: 210 LBS

## 2021-03-04 PROCEDURE — 93306 TTE W/DOPPLER COMPLETE: CPT | Mod: 59

## 2021-03-04 PROCEDURE — 99072 ADDL SUPL MATRL&STAF TM PHE: CPT

## 2021-03-04 PROCEDURE — 36415 COLL VENOUS BLD VENIPUNCTURE: CPT

## 2021-03-04 PROCEDURE — 93970 EXTREMITY STUDY: CPT | Mod: 59

## 2021-03-04 PROCEDURE — 93000 ELECTROCARDIOGRAM COMPLETE: CPT

## 2021-03-04 PROCEDURE — 99215 OFFICE O/P EST HI 40 MIN: CPT | Mod: 25

## 2021-03-04 NOTE — ASSESSMENT
[FreeTextEntry1] : 1. HTN: BP at ACC/AHA 2017 guideline target: off medication this am\par             - continue candesartan 16mg po daily \par             - continue ambulatory BP log \par \par 2. TIA: in 2000, carotid artery stenosis, b/l prox ICA 1-19% (11/29/2015)\par             - continue aggressive medical therapy\par             - will follow with periodic sonograms. \par \par 3. Lumbar degenerative disc disease: Lumbar disc disease: s/p spinal surgery with francie insertion '99\par             - follow up with spine specialist Baron Vannessa MD \par \par 4. Obstructive sleep apnea and asthma: moderate not on CPAP\par             - patient encouraged to meet with sleep specialist to discuss alternatives to traditional face mask CPAP\par             - discussed with patient importance of compliance with asthma medications. \par \par 5. Mild intermittent asthma, uncomplicated \par             - continue Montelukast Sodium Tablet, 10 MG, 1 tablet, Orally, Once a day, 90 days, 90\par             - continue Advair Diskus, 500-50 MCG/DOSE, 1 puff, Inhalation, Twice a day\par             - continue ProAir HFA, 108 (90 Base) MCG/ACT, 2 puffs as needed, Inhalation, every 4 hrs\par \par 6. Syncope: 2 episodes of near syncope:\par             - will send for an echocardiogram \par             - will order a mobile cardiac telemetry monitor (prefers patch)\par \par 7. Hypercholesteremia: lipids optimal\par             - continue Crestor 40mg po daily\par             - discussed with patient therapeutic lifestyle changes to improve lipid metabolism\par             - check lab work today\par \par 8. DVT s/p spinal surgery in the past, symptomatic varicose veins: s/p 03/04/21: sono: + chronic nonocclusive DVT rPOP vein. \par             - will follow with serial sonogram\par             - continue preventive measures\par \par \par  \par \par

## 2021-03-04 NOTE — HISTORY OF PRESENT ILLNESS
[FreeTextEntry1] : Mr. Lainez presents for follow up and management of HTN, syncope, RBBB, and dyslipidemia. He has a history of lumbar disc disease and underwent spinal surgery with francie insertion in '99  He had complaints of progressive ROMAN and was evaluated by a pulmonologist at [facility] who ordered a chest CT which revealed a hiatal hernia but no obvious pulmonary parenchymal problem. He was asked to have a work up for a cardiac etiology of his ROMAN. He had an exercise stress echocardiogram on 10/12/16 which revealed an EF of 60% with normal wall motion and PA pressures post 7 METS of exercise but did reveal marked exertional dyspnea.  He was evaluated by a vascular surgeon, Dr. Restrepo, at [facility], and had a left lower extremity sonogram and was prescribed a compression stockings.  He moved out to [place], [place].  He had an echocardiogram on 09/10/19 which revealed an EF of 69% and was a normal study.   On 05/07/20 he was admitted to a hospital in [place] with acute cholecystitis and underwent robotic assisted cholecystectomy.  Today, 03/04/21, he had an echocardiogram which revealed an EF of 69% and was a normal study.  Today, 03/04/21, he had bilateral lower extremity venous sonography which revealed chronic nonocclusive DVT of the right popliteal vein.   At present, he is doing well and denies any cardiovascular complaints.

## 2021-03-04 NOTE — REASON FOR VISIT
[Follow-Up - Clinic] : a clinic follow-up of [FreeTextEntry1] : Diagnostic Tests:\par ----------------------------------------------------\par ECG: \par 03/04/21: NSR, RBBB. \par 12/10/19: sinus bradycardia at 59, RBBB, diffuse T-wave abnormality.\par 01/17/19: NSR, RBBB. \par 09/12/18: NSR, RBBB. \par 07/06/17: NSR, RBBB.\par 06/01/16: NSR, RBBB. \par 06/11/15: NSR, RBBB. \par 03/04/13: NSR, normal ECG.\par ----------------------------------------------------\par Echo: \par 03/04/21: EF 69%, normal study, contrast employed. \par 09/10/19: EF 69%, normal echo. \par 09/27/18: EF 60%, normal echo. \par 06/11/15: EF 57%, grade I diastolic dysfunction, mild-moderate PI\par 07/22/08: EF 67%, LVH, trace MR/TR\par -----------------------------------------------------\par Stress: \par 10/12/16: exercise stress echo: EF 60%, normal wall motion and PA pressures. \par 11/23/15: exercise stress echo: EF 60%, normal wall motion and PA pressures. \par 09/29/09: exercise stress echo: EF 65%, normal wall motion and ECG post 13 METS\par -----------------------------------------------------\par Carotid:\par 11/23/15: sono; b/l prox ICA 1-19%.\par -----------------------------------------------------\par Lower extremity veins:\par 03/04/21: sono: + chronic nonocclusive DVT rPOP vein, rGSV not visualized (ablated),  left normal. \par 12/10/19: sono: + chronic nonocclusive DVT rPOP vein, + chronic nonocclusive SVT rPAV, + right varicose veins, left normal.

## 2021-03-05 LAB
ALBUMIN SERPL ELPH-MCNC: 4.5 G/DL
ALP BLD-CCNC: 75 U/L
ALT SERPL-CCNC: 12 U/L
ANION GAP SERPL CALC-SCNC: 15 MMOL/L
AST SERPL-CCNC: 16 U/L
BASOPHILS # BLD AUTO: 0.05 K/UL
BASOPHILS NFR BLD AUTO: 0.7 %
BILIRUB SERPL-MCNC: 1 MG/DL
BUN SERPL-MCNC: 19 MG/DL
CALCIUM SERPL-MCNC: 9.6 MG/DL
CHLORIDE SERPL-SCNC: 104 MMOL/L
CHOLEST SERPL-MCNC: 133 MG/DL
CO2 SERPL-SCNC: 25 MMOL/L
CREAT SERPL-MCNC: 1.57 MG/DL
EOSINOPHIL # BLD AUTO: 0.14 K/UL
EOSINOPHIL NFR BLD AUTO: 1.9 %
ESTIMATED AVERAGE GLUCOSE: 111 MG/DL
GLUCOSE SERPL-MCNC: 73 MG/DL
HBA1C MFR BLD HPLC: 5.5 %
HCT VFR BLD CALC: 43.9 %
HDLC SERPL-MCNC: 61 MG/DL
HGB BLD-MCNC: 14.2 G/DL
IMM GRANULOCYTES NFR BLD AUTO: 0.3 %
LDLC SERPL CALC-MCNC: 55 MG/DL
LDLC SERPL DIRECT ASSAY-MCNC: 55 MG/DL
LYMPHOCYTES # BLD AUTO: 1.18 K/UL
LYMPHOCYTES NFR BLD AUTO: 16.3 %
MAN DIFF?: NORMAL
MCHC RBC-ENTMCNC: 31.2 PG
MCHC RBC-ENTMCNC: 32.3 GM/DL
MCV RBC AUTO: 96.5 FL
MONOCYTES # BLD AUTO: 0.56 K/UL
MONOCYTES NFR BLD AUTO: 7.7 %
NEUTROPHILS # BLD AUTO: 5.31 K/UL
NEUTROPHILS NFR BLD AUTO: 73.1 %
NONHDLC SERPL-MCNC: 72 MG/DL
PLATELET # BLD AUTO: 236 K/UL
POTASSIUM SERPL-SCNC: 4.7 MMOL/L
PROT SERPL-MCNC: 6.8 G/DL
RBC # BLD: 4.55 M/UL
RBC # FLD: 13 %
SODIUM SERPL-SCNC: 144 MMOL/L
TRIGL SERPL-MCNC: 86 MG/DL
TSH SERPL-ACNC: 2.1 UIU/ML
WBC # FLD AUTO: 7.26 K/UL

## 2021-05-25 ENCOUNTER — APPOINTMENT (OUTPATIENT)
Dept: HEART AND VASCULAR | Facility: CLINIC | Age: 71
End: 2021-05-25

## 2021-06-08 ENCOUNTER — RX RENEWAL (OUTPATIENT)
Age: 71
End: 2021-06-08

## 2021-07-01 ENCOUNTER — APPOINTMENT (OUTPATIENT)
Dept: HEART AND VASCULAR | Facility: CLINIC | Age: 71
End: 2021-07-01

## 2021-07-20 ENCOUNTER — APPOINTMENT (OUTPATIENT)
Dept: HEART AND VASCULAR | Facility: CLINIC | Age: 71
End: 2021-07-20
Payer: MEDICARE

## 2021-07-20 VITALS
BODY MASS INDEX: 31.08 KG/M2 | WEIGHT: 222 LBS | DIASTOLIC BLOOD PRESSURE: 79 MMHG | HEIGHT: 71 IN | SYSTOLIC BLOOD PRESSURE: 121 MMHG | HEART RATE: 96 BPM | OXYGEN SATURATION: 95 %

## 2021-07-20 PROCEDURE — 99214 OFFICE O/P EST MOD 30 MIN: CPT

## 2021-07-20 NOTE — REASON FOR VISIT
[Source: ______] : History obtained from [unfilled] [FreeTextEntry1] : Diagnostic Tests:\par ----------------------------------------------------\par ECG: \par 03/04/21: NSR, RBBB. \par 12/10/19: sinus bradycardia at 59, RBBB, diffuse T-wave abnormality.\par 01/17/19: NSR, RBBB. \par 09/12/18: NSR, RBBB. \par 07/06/17: NSR, RBBB.\par 06/01/16: NSR, RBBB. \par 06/11/15: NSR, RBBB. \par 03/04/13: NSR, normal ECG.\par ----------------------------------------------------\par Echo: \par 03/04/21: EF 69%, normal study, contrast employed. \par 09/10/19: EF 69%, normal echo. \par 09/27/18: EF 60%, normal echo. \par 06/11/15: EF 57%, grade I diastolic dysfunction, mild-moderate PI\par 07/22/08: EF 67%, LVH, trace MR/TR\par -----------------------------------------------------\par Stress: \par 10/12/16: exercise stress echo: EF 60%, normal wall motion and PA pressures. \par 11/23/15: exercise stress echo: EF 60%, normal wall motion and PA pressures. \par 09/29/09: exercise stress echo: EF 65%, normal wall motion and ECG post 13 METS\par -----------------------------------------------------\par Carotid:\par 11/23/15: sono; b/l prox ICA 1-19%.\par -----------------------------------------------------\par Lower extremity veins:\par 03/04/21: sono: + chronic nonocclusive DVT rPOP vein, rGSV not visualized (ablated),  left normal. \par 12/10/19: sono: + chronic nonocclusive DVT rPOP vein, + chronic nonocclusive SVT rPAV, + right varicose veins, left normal.

## 2021-07-20 NOTE — ASSESSMENT
[FreeTextEntry1] : 1. HTN: BP at ACC/AHA 2017 guideline target: off medication this am\par             - continue candesartan 16mg po daily \par             - continue ambulatory BP log \par \par 2. TIA: in 2000, carotid artery stenosis, b/l prox ICA 1-19% (11/29/2015)\par             - continue aggressive medical therapy\par             - will follow with periodic sonograms. \par \par 3. Lumbar degenerative disc disease: Lumbar disc disease: s/p spinal surgery with francie insertion '99\par             - follow up with spine specialist Baron Vannessa MD \par \par 4. Obstructive sleep apnea and asthma: moderate not on CPAP\par             - patient encouraged to meet with sleep specialist to discuss alternatives to traditional face mask CPAP\par             - discussed with patient importance of compliance with asthma medications. \par \par 5. Mild intermittent asthma, uncomplicated \par             - continue Montelukast Sodium Tablet, 10 MG, 1 tablet, Orally, Once a day, 90 days, 90\par             - continue Advair Diskus, 500-50 MCG/DOSE, 1 puff, Inhalation, Twice a day\par             - continue ProAir HFA, 108 (90 Base) MCG/ACT, 2 puffs as needed, Inhalation, every 4 hrs\par \par 6. Syncope: 2 episodes of near syncope:\par             - will send for an echocardiogram \par             - will order a mobile cardiac telemetry monitor (prefers patch)\par \par 7. Hypercholesteremia: lipids optimal\par             - continue Crestor 40mg po daily\par             - discussed with patient therapeutic lifestyle changes to improve lipid metabolism\par \par 8. DVT s/p spinal surgery in the past, symptomatic varicose veins: s/p 03/04/21: sono: + chronic nonocclusive DVT rPOP vein. \par             - will follow with serial sonogram\par             - continue preventive measures\par \par 9. CKD III: 1.37, GFR 52 (04/13/21) \par            - follow up with nephrologist, Tj Verde MD\par            - avoid nephrotoxic agents\par \par  \par \par

## 2021-07-20 NOTE — HISTORY OF PRESENT ILLNESS
[FreeTextEntry1] : Mr. Lainez presents for follow up and management of HTN, syncope, RBBB, CKD III, and dyslipidemia. He has a history of lumbar disc disease and underwent spinal surgery with francie insertion in '99  He had complaints of progressive ROMAN and was evaluated by a pulmonologist at [facility] who ordered a chest CT which revealed a hiatal hernia but no obvious pulmonary parenchymal problem. He was asked to have a work up for a cardiac etiology of his ROMAN. He had an exercise stress echocardiogram on 10/12/16 which revealed an EF of 60% with normal wall motion and PA pressures post 7 METS of exercise but did reveal marked exertional dyspnea.  He was evaluated by a vascular surgeon, Dr. Restrepo, at [facility], and had a left lower extremity sonogram and was prescribed a compression stockings.  He moved out to [place], [place].  On 03/04/21 he had an echocardiogram which revealed an EF of 69% and was a normal study.  On 03/04/21 he had bilateral lower extremity venous sonography which revealed chronic nonocclusive DVT of the right popliteal vein.   At present, he is doing well and denies any cardiovascular complaints. He is following with a nephrologist, Tj Verde MD, for CKD stage III. \par \par

## 2021-09-08 ENCOUNTER — IMPORTED ENCOUNTER (OUTPATIENT)
Dept: URBAN - METROPOLITAN AREA CLINIC 38 | Facility: CLINIC | Age: 71
End: 2021-09-08

## 2021-09-08 PROBLEM — H25.13 CATARACT (AGE RELATED) - NS (NUCLEAR): Noted: 2021-09-08

## 2021-09-08 PROBLEM — H25.13 CATARACT (AGE RELATED) - NS (NUCLEAR) - OU: Noted: 2021-09-08

## 2021-09-08 PROBLEM — H25.13 NUCLEAR SCLEROSIS: Noted: 2021-09-08

## 2021-09-08 PROCEDURE — 92014 COMPRE OPH EXAM EST PT 1/>: CPT

## 2021-11-16 ENCOUNTER — APPOINTMENT (OUTPATIENT)
Dept: HEART AND VASCULAR | Facility: CLINIC | Age: 71
End: 2021-11-16
Payer: COMMERCIAL

## 2021-12-16 ENCOUNTER — APPOINTMENT (OUTPATIENT)
Dept: HEART AND VASCULAR | Facility: CLINIC | Age: 71
End: 2021-12-16

## 2022-02-01 ENCOUNTER — NON-APPOINTMENT (OUTPATIENT)
Age: 72
End: 2022-02-01

## 2022-02-01 ENCOUNTER — APPOINTMENT (OUTPATIENT)
Dept: HEART AND VASCULAR | Facility: CLINIC | Age: 72
End: 2022-02-01
Payer: COMMERCIAL

## 2022-02-01 VITALS
HEART RATE: 82 BPM | DIASTOLIC BLOOD PRESSURE: 84 MMHG | SYSTOLIC BLOOD PRESSURE: 124 MMHG | TEMPERATURE: 97.1 F | WEIGHT: 215 LBS | OXYGEN SATURATION: 97 % | HEIGHT: 71 IN | BODY MASS INDEX: 30.1 KG/M2

## 2022-02-01 PROCEDURE — 99214 OFFICE O/P EST MOD 30 MIN: CPT | Mod: 25

## 2022-02-01 PROCEDURE — 93000 ELECTROCARDIOGRAM COMPLETE: CPT

## 2022-02-01 NOTE — ASSESSMENT
[FreeTextEntry1] : 1. HTN: BP at ACC/AHA 2017 guideline target: off medication this am\par             - continue candesartan 16mg po daily \par             - continue ambulatory BP log \par \par 2. TIA: in 2000, carotid artery stenosis, b/l prox ICA 1-19% (11/29/2015)\par             - continue aggressive medical therapy\par             - will follow with periodic sonograms. \par \par 3. Lumbar degenerative disc disease: Lumbar disc disease: s/p spinal surgery with francie insertion '99\par             - follow up with spine specialist Baron Vannessa MD \par \par 4. Obstructive sleep apnea and asthma: moderate not on CPAP\par             - patient encouraged to meet with sleep specialist to discuss alternatives to traditional face mask CPAP\par             - discussed with patient importance of compliance with asthma medications. \par \par 5. Mild intermittent asthma, uncomplicated \par             - continue Montelukast Sodium Tablet, 10 MG, 1 tablet, Orally, Once a day, 90 days, 90\par             - continue Advair Diskus, 500-50 MCG/DOSE, 1 puff, Inhalation, Twice a day\par             - continue ProAir HFA, 108 (90 Base) MCG/ACT, 2 puffs as needed, Inhalation, every 4 hrs\par \par 6. Syncope: 2 episodes of near syncope: s/p normal work up \par             - will manage conservatively \par \par 7. Hypercholesteremia: lipids optimal\par             - continue Crestor 40mg po daily\par             - discussed with patient therapeutic lifestyle changes to improve lipid metabolism\par \par 8. DVT s/p spinal surgery in the past, symptomatic varicose veins: s/p 03/04/21: sono: + chronic nonocclusive DVT rPOP vein. \par             - will follow with serial lower extremity venous sonograms (ordered today)\par             - continue preventive measures\par \par 9. CKD III: 1.37, GFR 52 (04/13/21) \par            - follow up with nephrologist, Tj Verde MD\par            - avoid nephrotoxic agents\par            - he will provide a copy of upcoming lab work from the nephrologist's office\par \par 10. ROMAN: \par            - will send for an exercise stress echocardiogram to rule out inducible ischemia \par \par \par An ECG was obtained to evaluate heart rhythm and screen for any underlying cardiac abnormalities. \par  \par \par

## 2022-02-01 NOTE — REASON FOR VISIT
[FreeTextEntry1] : Diagnostic Tests:\par ----------------------------------------------------\par ECG: \par 02/01/22: NSR, RBBB.\par 03/04/21: NSR, RBBB. \par 12/10/19: sinus bradycardia at 59, RBBB, diffuse T-wave abnormality.\par 01/17/19: NSR, RBBB. \par 09/12/18: NSR, RBBB. \par 07/06/17: NSR, RBBB.\par 06/01/16: NSR, RBBB. \par 06/11/15: NSR, RBBB. \par 03/04/13: NSR, normal ECG.\par ----------------------------------------------------\par Echo: \par 03/04/21: EF 69%, normal study, contrast employed. \par 09/10/19: EF 69%, normal echo. \par 09/27/18: EF 60%, normal echo. \par 06/11/15: EF 57%, grade I diastolic dysfunction, mild-moderate PI\par 07/22/08: EF 67%, LVH, trace MR/TR\par -----------------------------------------------------\par Stress: \par 10/12/16: exercise stress echo: EF 60%, normal wall motion and PA pressures. \par 11/23/15: exercise stress echo: EF 60%, normal wall motion and PA pressures. \par 09/29/09: exercise stress echo: EF 65%, normal wall motion and ECG post 13 METS\par -----------------------------------------------------\par Carotid:\par 11/23/15: sono; b/l prox ICA 1-19%.\par -----------------------------------------------------\par Lower extremity veins:\par 03/04/21: sono: + chronic nonocclusive DVT rPOP vein, rGSV not visualized (ablated),  left normal. \par 12/10/19: sono: + chronic nonocclusive DVT rPOP vein, + chronic nonocclusive SVT rPAV, + right varicose veins, left normal.

## 2022-02-01 NOTE — HISTORY OF PRESENT ILLNESS
[FreeTextEntry1] : Mr. Lainez presents for follow up and management of HTN, syncope, RBBB, CKD III, and dyslipidemia. He has a history of lumbar disc disease and underwent spinal surgery with francie insertion in '99  He had complaints of progressive ROMAN and was evaluated by a pulmonologist at [facility] who ordered a chest CT which revealed a hiatal hernia but no obvious pulmonary parenchymal problem. He was asked to have a work up for a cardiac etiology of his ROMAN. He had an exercise stress echocardiogram on 10/12/16 which revealed an EF of 60% with normal wall motion and PA pressures post 7 METS of exercise but did reveal marked exertional dyspnea.  He was evaluated by a vascular surgeon, Dr. Restrepo, at [facility], and had a left lower extremity sonogram and was prescribed a compression stockings.  He moved out to [place], [place].  On 03/04/21 he had an echocardiogram which revealed an EF of 69% and was a normal study.  On 03/04/21 he had bilateral lower extremity venous sonography which revealed chronic nonocclusive DVT of the right popliteal vein.  He is following with a nephrologist, Tj Verde MD, for CKD stage III.   He has ROMAN to 2 flights of stairs.

## 2022-05-05 ENCOUNTER — APPOINTMENT (OUTPATIENT)
Dept: HEART AND VASCULAR | Facility: CLINIC | Age: 72
End: 2022-05-05
Payer: MEDICARE

## 2022-05-05 PROCEDURE — 93970 EXTREMITY STUDY: CPT

## 2022-05-05 PROCEDURE — 93351 STRESS TTE COMPLETE: CPT

## 2022-05-09 ENCOUNTER — APPOINTMENT (OUTPATIENT)
Dept: HEART AND VASCULAR | Facility: CLINIC | Age: 72
End: 2022-05-09
Payer: MEDICARE

## 2022-05-09 VITALS
HEART RATE: 84 BPM | HEIGHT: 71 IN | BODY MASS INDEX: 29.4 KG/M2 | DIASTOLIC BLOOD PRESSURE: 79 MMHG | SYSTOLIC BLOOD PRESSURE: 128 MMHG | WEIGHT: 210 LBS

## 2022-05-09 PROCEDURE — 99443: CPT

## 2022-05-09 NOTE — ASSESSMENT
[FreeTextEntry1] : 1. HTN: BP at ACC/AHA 2017 guideline target: \par             - continue candesartan 16mg po daily \par             - continue ambulatory BP log \par \par 2. TIA: in 2000, carotid artery stenosis, b/l prox ICA 1-19% (11/29/2015)\par             - continue aggressive medical therapy\par             - will follow with periodic sonograms. \par \par 3. Lumbar degenerative disc disease: Lumbar disc disease: s/p spinal surgery with francie insertion '99\par             - follow up with spine specialist Baron Vannessa MD \par \par 4. Obstructive sleep apnea and asthma: moderate not on CPAP\par             - patient encouraged to meet with sleep specialist to discuss alternatives to traditional face mask CPAP\par             - discussed with patient importance of compliance with asthma medications. \par \par 5. Mild intermittent asthma, uncomplicated \par             - continue Montelukast Sodium Tablet, 10 MG, 1 tablet, Orally, Once a day, 90 days, 90\par             - continue Advair Diskus, 500-50 MCG/DOSE, 1 puff, Inhalation, Twice a day\par             - continue ProAir HFA, 108 (90 Base) MCG/ACT, 2 puffs as needed, Inhalation, every 4 hrs\par \par 6. Syncope: 2 episodes of near syncope: s/p normal work up \par             - will manage conservatively \par \par 7. Hypercholesteremia: lipids optimal\par             - continue rosuvastaiun 40mg po daily\par             - discussed with patient therapeutic lifestyle changes to improve lipid metabolism\par \par 8. DVT s/p spinal surgery in the past, symptomatic varicose veins: s/p 015/05/22: sono: + chronic nonocclusive DVT rPOP vein, rGSV not visualized (ablated),  left normal. \par             - will follow with serial lower extremity venous sonograms (ordered today)\par             - continue preventive measures\par \par 9. CKD III: 1.37, GFR 52 (04/13/21) \par            - follow up with nephrologist, Tj Verde MD\par            - avoid nephrotoxic agents\par            - he will provide a copy of upcoming lab work from the nephrologist's office\par \par 10. ROMAN: s/p normal stress echo at low work load (05/05/22)\par            - encouraged increase exercise\par \par This encounter was performed as a telehealth encounter employing the [employer], or other approved audio/video platform.  All components of the evaluation and management were performed per clinical routine with the exception of the physical exam.  The physical exam references my most recent physical exam plus any additional information provided by the patient (i.e. ambulatory vitals/weight) or inspection from the video portion of the encounter. \par \par I spent in excess of 40 minutes on the encounter.  \par \par Verbal consent was given on 05/09/22 by Juancho Lainez. \par \par Patient location: The patient was located at the primary address listed in the medical record.\par Physician location: I was located in my office in [facility].\par \par \par

## 2022-05-09 NOTE — HISTORY OF PRESENT ILLNESS
[FreeTextEntry1] : Mr. Lainez presents for follow up and management of HTN, syncope, RBBB, CKD III, and dyslipidemia. He has a history of lumbar disc disease and underwent spinal surgery with francie insertion in '99  He had complaints of progressive ROMAN and was evaluated by a pulmonologist at [facility] who ordered a chest CT which revealed a hiatal hernia but no obvious pulmonary parenchymal problem. He was asked to have a work up for a cardiac etiology of his ROMAN. He had an exercise stress echocardiogram on 10/12/16 which revealed an EF of 60% with normal wall motion and PA pressures post 7 METS of exercise but did reveal marked exertional dyspnea.  He was evaluated by a vascular surgeon, Dr. Restrepo, at [facility], and had a left lower extremity sonogram and was prescribed a compression stockings.  He moved out to [place], [place].  On 03/04/21 he had an echocardiogram which revealed an EF of 69% and was a normal study.  On 05/05/22, he had bilateral lower extremity venous sonography which revealed chronic nonocclusive DVT of the right popliteal vein.  He is following with a nephrologist, Tj Verde MD, for CKD stage III.   On 05/05/22, he had an exercise stress echocardiogram which revealed an EF of 65% and normal wall motion, PA pressures, and ECG post 4.6 METs of exercise. Of note, he had a slip and fall on the treadmill and scraped his knee (without significant injury).

## 2022-05-09 NOTE — REASON FOR VISIT
[Source: ______] : History obtained from [unfilled] [FreeTextEntry1] : Diagnostic Tests:\par ----------------------------------------------------\par ECG: \par 02/01/22: NSR, RBBB.\par 03/04/21: NSR, RBBB. \par 12/10/19: sinus bradycardia at 59, RBBB, diffuse T-wave abnormality.\par 01/17/19: NSR, RBBB. \par 09/12/18: NSR, RBBB. \par 07/06/17: NSR, RBBB.\par 06/01/16: NSR, RBBB. \par 06/11/15: NSR, RBBB. \par 03/04/13: NSR, normal ECG.\par ----------------------------------------------------\par Echo: \par 03/04/21: EF 69%, normal study, contrast employed. \par 09/10/19: EF 69%, normal echo. \par 09/27/18: EF 60%, normal echo. \par 06/11/15: EF 57%, grade I diastolic dysfunction, mild-moderate PI\par 07/22/08: EF 67%, LVH, trace MR/TR\par -----------------------------------------------------\par Stress: \par 05/05/22: exercise stress echo: EF 65%, normal wall motion, PA pressures, and ECG post 4.6 METs of exercise. \par 10/12/16: exercise stress echo: EF 60%, normal wall motion and PA pressures. \par 11/23/15: exercise stress echo: EF 60%, normal wall motion and PA pressures. \par 09/29/09: exercise stress echo: EF 65%, normal wall motion and ECG post 13 METS\par -----------------------------------------------------\par Carotid:\par 11/23/15: sono; b/l prox ICA 1-19%.\par -----------------------------------------------------\par Lower extremity veins:\par 05/05/22: sono: + chronic nonocclusive DVT rPOP vein, rGSV not visualized (ablated),  left normal. \par 03/04/21: sono: + chronic nonocclusive DVT rPOP vein, rGSV not visualized (ablated),  left normal. \par 12/10/19: sono: + chronic nonocclusive DVT rPOP vein, + chronic nonocclusive SVT rPAV, + right varicose veins, left normal.

## 2022-06-04 ASSESSMENT — VISUAL ACUITY
OD_BAT: 20/50
OD_CC: J1
OS_BAT: 20/100
OS_CC: J1
OS_BAT: 20/100
OD_CC: J1
OS_CC: J1
OS_CC: J1
OD_CC: 20/30
OD_CC: J1
OD_BAT: 20/60
OS_CC: 20/20-1
OD_CC: 20/30-1
OS_CC: 20/30-1
OD_CC: J3
OS_CC: 20/30+1
OD_CC: 20/30
OS_CC: J1
OD_CC: 20/25-1
OS_BAT: 20/200
OD_BAT: 20/50
OS_CC: 20/30+1

## 2022-06-04 ASSESSMENT — KERATOMETRY
OS_AXISANGLE2_DEGREES: 78
OS_AXISANGLE_DEGREES: 77
OS_K1POWER_DIOPTERS: 48.00
OD_K1POWER_DIOPTERS: 47.75
OS_AXISANGLE2_DEGREES: 80
OD_AXISANGLE2_DEGREES: 2
OS_K1POWER_DIOPTERS: 48.00
OD_AXISANGLE2_DEGREES: 94
OS_K2POWER_DIOPTERS: 45.75
OS_K1POWER_DIOPTERS: 48.00
OD_K2POWER_DIOPTERS: 47.75
OS_AXISANGLE_DEGREES: 168
OS_K2POWER_DIOPTERS: 46.00
OS_AXISANGLE2_DEGREES: 167
OD_AXISANGLE_DEGREES: 7
OS_K2POWER_DIOPTERS: 46.50
OD_AXISANGLE_DEGREES: 4
OD_K2POWER_DIOPTERS: 46.25
OD_K1POWER_DIOPTERS: 47.75
OS_AXISANGLE2_DEGREES: 79
OS_K1POWER_DIOPTERS: 46.00
OD_K1POWER_DIOPTERS: 48.25
OD_AXISANGLE2_DEGREES: 97
OS_AXISANGLE_DEGREES: 170
OD_K1POWER_DIOPTERS: 45.50
OD_K2POWER_DIOPTERS: 45.50
OD_AXISANGLE_DEGREES: 92
OS_AXISANGLE_DEGREES: 169
OD_K2POWER_DIOPTERS: 45.50
OD_AXISANGLE2_DEGREES: 97
OD_AXISANGLE_DEGREES: 7
OS_K2POWER_DIOPTERS: 48.00

## 2022-06-04 ASSESSMENT — TONOMETRY
OD_IOP_MMHG: 11
OS_IOP_MMHG: 12
OD_IOP_MMHG: 10
OS_IOP_MMHG: 11
OS_IOP_MMHG: 9
OD_IOP_MMHG: 12

## 2022-09-13 ENCOUNTER — NON-APPOINTMENT (OUTPATIENT)
Age: 72
End: 2022-09-13

## 2022-09-14 ENCOUNTER — ESTABLISHED COMPREHENSIVE EXAM (OUTPATIENT)
Dept: URBAN - METROPOLITAN AREA CLINIC 68 | Facility: CLINIC | Age: 72
End: 2022-09-14

## 2022-09-14 DIAGNOSIS — H25.13: ICD-10-CM

## 2022-09-14 PROCEDURE — 92014 COMPRE OPH EXAM EST PT 1/>: CPT

## 2022-09-14 ASSESSMENT — VISUAL ACUITY
OS_CC: J2
OD_CC: 20/30-1
OS_CC: 20/30-1
OD_GLARE: 20/25
OD_CC: J2
OS_GLARE: 20/30-1

## 2022-09-14 ASSESSMENT — TONOMETRY
OD_IOP_MMHG: 10
OS_IOP_MMHG: 9

## 2022-09-14 ASSESSMENT — KERATOMETRY
OD_AXISANGLE_DEGREES: 92
OS_AXISANGLE2_DEGREES: 167
OS_K2POWER_DIOPTERS: 48.00
OD_K2POWER_DIOPTERS: 47.75
OD_K1POWER_DIOPTERS: 45.50
OS_AXISANGLE_DEGREES: 77
OS_K1POWER_DIOPTERS: 46.00
OD_AXISANGLE2_DEGREES: 2

## 2022-11-03 ENCOUNTER — NON-APPOINTMENT (OUTPATIENT)
Age: 72
End: 2022-11-03

## 2022-11-03 ENCOUNTER — APPOINTMENT (OUTPATIENT)
Dept: HEART AND VASCULAR | Facility: CLINIC | Age: 72
End: 2022-11-03

## 2022-11-03 VITALS
BODY MASS INDEX: 30.52 KG/M2 | OXYGEN SATURATION: 95 % | TEMPERATURE: 96.4 F | DIASTOLIC BLOOD PRESSURE: 78 MMHG | SYSTOLIC BLOOD PRESSURE: 113 MMHG | WEIGHT: 218 LBS | HEIGHT: 71 IN | HEART RATE: 89 BPM

## 2022-11-03 DIAGNOSIS — K44.9 DIAPHRAGMATIC HERNIA W/OUT OBSTRUCTION OR GANGRENE: ICD-10-CM

## 2022-11-03 PROCEDURE — 93000 ELECTROCARDIOGRAM COMPLETE: CPT

## 2022-11-03 PROCEDURE — 99214 OFFICE O/P EST MOD 30 MIN: CPT | Mod: 25

## 2022-11-11 NOTE — ASSESSMENT
[FreeTextEntry1] : 1. HTN: BP at ACC/AHA 2017 guideline target: \par             - continue candesartan 16mg po daily \par \par 2. TIA: in 2000, carotid artery stenosis, b/l prox ICA 1-19% (11/29/2015)\par             - continue aggressive medical therapy\par             - will follow with periodic sonograms. \par \par 3. Lumbar degenerative disc disease: Lumbar disc disease: s/p spinal surgery with francie insertion '99\par             - follow up with spine specialist Baron Vannessa MD \par \par 4. Obstructive sleep apnea and asthma: moderate not on CPAP\par             - patient encouraged to meet with sleep specialist to discuss alternatives to traditional face mask CPAP\par             - discussed with patient importance of compliance with asthma medications. \par \par 5. Mild intermittent asthma, uncomplicated \par             - continue Montelukast Sodium Tablet, 10 MG, 1 tablet, Orally, Once a day, 90 days, 90\par             - continue ProAir HFA, 108 (90 Base) MCG/ACT, 2 puffs as needed, Inhalation, every 4 hrs\par \par 6. Syncope: vasovagal most recent episode 09/12/22: s/p normal work up: likely exacerbated by tamsulosin\par            - discussed with patient preventive strategies including maintaining adequate volume status, avoiding prolonged standing, or skipping meals as well as abortive maneuvers including physical counter pressure \par \par 7. Dyslipidemia: lipids optimal\par             - continue rosuvastatin 40mg po daily\par             - discussed with patient therapeutic lifestyle changes to improve lipid metabolism\par \par 8. DVT s/p spinal surgery in the past, symptomatic varicose veins: s/p 015/05/22: sono: + chronic nonocclusive DVT rPOP vein, rGSV not visualized (ablated),  left normal. \par             - will follow with serial lower extremity venous sonograms \par             - continue preventive measures\par \par 9. CKD III: 1.28, eGFR 59 (09/12/22): \par            - follow up with nephrologist, Tj Verde MD\par            - avoid nephrotoxic agents\par \par 10. CAD: moderate coronary artery calcifications noted on CT chest:\par           - will pursue medical management\par           - will consider sending for a CCTA in the future\par \par \par \par

## 2022-11-11 NOTE — HISTORY OF PRESENT ILLNESS
[FreeTextEntry1] : Mr. Lainez presents for follow up and management of CAD, HTN, vasovagal syncope, RBBB, CKD III, hiatal hernia, and dyslipidemia. He has a history of lumbar disc disease and underwent spinal surgery with francie insertion in '99  He had complaints of progressive ROMAN and was evaluated by a pulmonologist at [facility] who ordered a chest CT which revealed a hiatal hernia but no obvious pulmonary parenchymal problem. He was asked to have a work up for a cardiac etiology of his ROMAN. He had an exercise stress echocardiogram on 10/12/16 which revealed an EF of 60% with normal wall motion and PA pressures post 7 METS of exercise but did reveal marked exertional dyspnea.  He was evaluated by a vascular surgeon, Dr. Restrepo, at [facility], and had a left lower extremity sonogram and was prescribed a compression stockings.  He moved out to [place], [place].  On 03/04/21 he had an echocardiogram which revealed an EF of 69% and was a normal study.  On 05/05/22, he had bilateral lower extremity venous sonography which revealed chronic nonocclusive DVT of the right popliteal vein.  He is following with a nephrologist, Tj Verde MD, for CKD stage III.   On 05/05/22, he had an exercise stress echocardiogram which revealed an EF of 65% and normal wall motion, PA pressures, and ECG post 4.6 METs of exercise. On 09/12/22, [place] he had a vasovagal syncopal episode.  He had a prodrome of diaphoresis and nausea.  He was taken to [facility] where he had a benign work up.  As part of the work up, on 09/12/22, he had a CTA chest to r/o PE which revealed moderate coronary artery calcifications, no PE, and a large hiatal hernia resulting in compressive atelectasis RLL. Of note, he initiated tamsulosin two weeks prior.  He will likely go on to have surgical repair of the large hiatal hernia.

## 2022-11-11 NOTE — REASON FOR VISIT
[Other: ____] : [unfilled] [FreeTextEntry1] : Diagnostic Tests:\par ----------------------------------------------------\par ECG: \par 11/03/22: sinus rhythm, RBBB. \par 02/01/22: NSR, RBBB.\par 03/04/21: NSR, RBBB. \par 12/10/19: sinus bradycardia at 59, RBBB, diffuse T-wave abnormality.\par 01/17/19: NSR, RBBB. \par 09/12/18: NSR, RBBB. \par 07/06/17: NSR, RBBB.\par 06/01/16: NSR, RBBB. \par 06/11/15: NSR, RBBB. \par 03/04/13: NSR, normal ECG.\par ----------------------------------------------------\par Echo: \par 09/12/22: at [facility]: EF 70%, mild LVH, aortic sclerosis. \par 03/04/21: EF 69%, normal study, contrast employed. \par 09/10/19: EF 69%, normal echo. \par 09/27/18: EF 60%, normal echo. \par 06/11/15: EF 57%, grade I diastolic dysfunction, mild-moderate PI\par 07/22/08: EF 67%, LVH, trace MR/TR\par -----------------------------------------------------\par Stress: \par 05/05/22: exercise stress echo: EF 65%, normal wall motion, PA pressures, and ECG post 4.6 METs of exercise. \par 10/12/16: exercise stress echo: EF 60%, normal wall motion and PA pressures. \par 11/23/15: exercise stress echo: EF 60%, normal wall motion and PA pressures. \par 09/29/09: exercise stress echo: EF 65%, normal wall motion and ECG post 13 METS\par -----------------------------------------------------\par Carotid:\par 11/23/15: sono; b/l prox ICA 1-19%.\par -----------------------------------------------------\par Lower extremity veins:\par 05/05/22: sono: + chronic nonocclusive DVT rPOP vein, rGSV not visualized (ablated),  left normal. \par 03/04/21: sono: + chronic nonocclusive DVT rPOP vein, rGSV not visualized (ablated),  left normal. \par 12/10/19: sono: + chronic nonocclusive DVT rPOP vein, + chronic nonocclusive SVT rPAV, + right varicose veins, left normal. \par -----------------------------------------------------\par CT:\par 09/12/22: CTA chest/PE: moderate coronary artery calcifications, no PE, large hiatal hernia resulting in compressive atelectasis RLL.

## 2022-11-11 NOTE — ADDENDUM
[FreeTextEntry1] : 11/11/22: Mr. Lainez is scheduled to undergo colonoscopy with Nathanael Newton DO ([phone] fax).  He has a history of CAD (moderate coronary artery calcifications noted on noncardiac chest CT).  He denies chest pain or ROMAN.  He had an exercise stress echocardiogram which did not reveal inducible ischemia at a work load of 4.6 METS. He may, therefore, proceed with surgery without cardiac contraindications.

## 2023-03-07 ENCOUNTER — APPOINTMENT (OUTPATIENT)
Dept: HEART AND VASCULAR | Facility: CLINIC | Age: 73
End: 2023-03-07
Payer: MEDICARE

## 2023-03-07 ENCOUNTER — NON-APPOINTMENT (OUTPATIENT)
Age: 73
End: 2023-03-07

## 2023-03-07 VITALS
HEIGHT: 71 IN | BODY MASS INDEX: 30.38 KG/M2 | DIASTOLIC BLOOD PRESSURE: 94 MMHG | HEART RATE: 88 BPM | OXYGEN SATURATION: 96 % | WEIGHT: 217 LBS | SYSTOLIC BLOOD PRESSURE: 147 MMHG

## 2023-03-07 VITALS — SYSTOLIC BLOOD PRESSURE: 143 MMHG | DIASTOLIC BLOOD PRESSURE: 94 MMHG

## 2023-03-07 PROCEDURE — 99214 OFFICE O/P EST MOD 30 MIN: CPT | Mod: 25

## 2023-03-07 PROCEDURE — 93000 ELECTROCARDIOGRAM COMPLETE: CPT

## 2023-03-07 NOTE — HISTORY OF PRESENT ILLNESS
[FreeTextEntry1] : Mr. Lainez presents for follow up and management of CAD, HTN, vasovagal syncope, RBBB, CKD III, hiatal hernia, and dyslipidemia. He has a history of lumbar disc disease and underwent spinal surgery with francie insertion in '99  He had complaints of progressive ROMAN and was evaluated by a pulmonologist at [facility] who ordered a chest CT which revealed a hiatal hernia but no obvious pulmonary parenchymal problem. He was asked to have a work up for a cardiac etiology of his ROMAN. He had an exercise stress echocardiogram on 10/12/16 which revealed an EF of 60% with normal wall motion and PA pressures post 7 METS of exercise but did reveal marked exertional dyspnea.  He was evaluated by a vascular surgeon, Dr. Restrepo, at [facility], and had a left lower extremity sonogram and was prescribed a compression stockings.  He moved out to [place], [place].  On 03/04/21 he had an echocardiogram which revealed an EF of 69% and was a normal study.  On 05/05/22, he had bilateral lower extremity venous sonography which revealed chronic nonocclusive DVT of the right popliteal vein.  He is following with a nephrologist, Tj Verde MD, for CKD stage III.   On 05/05/22, he had an exercise stress echocardiogram which revealed an EF of 65% and normal wall motion, PA pressures, and ECG post 4.6 METs of exercise. On 09/12/22, [place] he had a vasovagal syncopal episode.  He had a prodrome of diaphoresis and nausea.  He was taken to [facility] where he had a benign work up.  As part of the work up, on 09/12/22, he had a CTA chest to r/o PE which revealed moderate coronary artery calcifications, no PE, and a large hiatal hernia resulting in compressive atelectasis RLL. Of note, he initiated tamsulosin two weeks prior.  He is preoperative for hiatal hernia repair surgery with Miguel Villalta MD ([facility], [place]  At present, he denies chest pain or SOB but has complaints of decreased appetite.

## 2023-03-07 NOTE — REASON FOR VISIT
[Other: ____] : [unfilled] [FreeTextEntry1] : Diagnostic Tests:\par ----------------------------------------------------\par ECG: \par 03/07/23: sinus rhythm, RBBB. \par 11/03/22: sinus rhythm, RBBB. \par 02/01/22: NSR, RBBB.\par 03/04/21: NSR, RBBB. \par 12/10/19: sinus bradycardia at 59, RBBB, diffuse T-wave abnormality.\par 01/17/19: NSR, RBBB. \par 09/12/18: NSR, RBBB. \par 07/06/17: NSR, RBBB.\par 06/01/16: NSR, RBBB. \par 06/11/15: NSR, RBBB. \par 03/04/13: NSR, normal ECG.\par ----------------------------------------------------\par Echo: \par 09/12/22: at [facility]: EF 70%, mild LVH, aortic sclerosis. \par 03/04/21: EF 69%, normal study, contrast employed. \par 09/10/19: EF 69%, normal echo. \par 09/27/18: EF 60%, normal echo. \par 06/11/15: EF 57%, grade I diastolic dysfunction, mild-moderate PI\par 07/22/08: EF 67%, LVH, trace MR/TR\par -----------------------------------------------------\par Stress: \par 05/05/22: exercise stress echo: EF 65%, normal wall motion, PA pressures, and ECG post 4.6 METs of exercise. \par 10/12/16: exercise stress echo: EF 60%, normal wall motion and PA pressures. \par 11/23/15: exercise stress echo: EF 60%, normal wall motion and PA pressures. \par 09/29/09: exercise stress echo: EF 65%, normal wall motion and ECG post 13 METS\par -----------------------------------------------------\par Carotid:\par 11/23/15: sono; b/l prox ICA 1-19%.\par -----------------------------------------------------\par Lower extremity veins:\par 05/05/22: sono: + chronic nonocclusive DVT rPOP vein, rGSV not visualized (ablated),  left normal. \par 03/04/21: sono: + chronic nonocclusive DVT rPOP vein, rGSV not visualized (ablated),  left normal. \par 12/10/19: sono: + chronic nonocclusive DVT rPOP vein, + chronic nonocclusive SVT rPAV, + right varicose veins, left normal. \par -----------------------------------------------------\par CT:\par 09/12/22: CTA chest/PE: moderate coronary artery calcifications, no PE, large hiatal hernia resulting in compressive atelectasis RLL.

## 2023-03-07 NOTE — ASSESSMENT
[FreeTextEntry1] : 1. HTN: BP not at ACC/AHA 2017 guideline target: did not take anti-HTN medications this am secondary to commuting to the office early: \par             - continue candesartan 16mg po daily \par             - recheck BP next visit on medication \par \par 2. TIA: in 2000, carotid artery stenosis, b/l prox ICA 1-19% (11/29/2015)\par             - continue aggressive medical therapy\par             - will follow with periodic sonograms. \par \par 3. Lumbar degenerative disc disease: Lumbar disc disease: s/p spinal surgery with francie insertion '99\par             - follow up with spine specialist Baron Vannessa MD \par \par 4. Obstructive sleep apnea and asthma: moderate not on CPAP\par             - patient encouraged to meet with sleep specialist to discuss alternatives to traditional face mask CPAP\par             - discussed with patient importance of compliance with asthma medications. \par \par 5. Mild intermittent asthma, uncomplicated \par             - continue Montelukast Sodium Tablet, 10 MG, 1 tablet, Orally, Once a day, 90 days, 90\par             - continue ProAir HFA, 108 (90 Base) MCG/ACT, 2 puffs as needed, Inhalation, every 4 hrs\par \par 6. Syncope: vasovagal most recent episode 09/12/22: s/p normal work up: likely exacerbated by tamsulosin\par            - discussed with patient preventive strategies including maintaining adequate volume status, avoiding prolonged standing, or skipping meals as well as abortive maneuvers including physical counter pressure \par \par 7. Dyslipidemia: \par             - continue rosuvastatin 40mg po daily\par             - discussed with patient therapeutic lifestyle changes to improve lipid metabolism\par             - patient will provide copy of recent lab work from PMD's office for my review \par \par 8. DVT s/p spinal surgery in the past, symptomatic varicose veins: s/p 05/05/22: sono: + chronic nonocclusive DVT rPOP vein, rGSV not visualized (ablated),  left normal: \par             - will follow with serial lower extremity venous sonograms \par             - continue preventive measures\par \par 9. CKD III: 1.33, eGFR 56 (12/27/22): \par            - follow up with nephrologist, Tj Verde MD\par            - avoid nephrotoxic agents\par \par 10. CAD: moderate coronary artery calcifications noted on CT chest:\par           - will pursue medical management\par           - will consider sending for a CCTA in the future\par \par 11. Preoperative for hiatal hernia repair surgery with Miguel Villalta MD ([facility]) ([phone] fax):\par           - Mr. Lainez denies chest pain or ROMAN\par           - he had a normal exercise stress echo on 05/05/22\par           - he may, therefore, proceed with surgery without cardiac contraindications \par  \par \par \par

## 2023-06-27 ENCOUNTER — APPOINTMENT (OUTPATIENT)
Dept: HEART AND VASCULAR | Facility: CLINIC | Age: 73
End: 2023-06-27
Payer: MEDICARE

## 2023-06-27 VITALS
OXYGEN SATURATION: 95 % | SYSTOLIC BLOOD PRESSURE: 104 MMHG | BODY MASS INDEX: 29.58 KG/M2 | DIASTOLIC BLOOD PRESSURE: 69 MMHG | HEIGHT: 71 IN | WEIGHT: 211.31 LBS | HEART RATE: 77 BPM | TEMPERATURE: 97.7 F

## 2023-06-27 PROCEDURE — 99214 OFFICE O/P EST MOD 30 MIN: CPT

## 2023-06-27 NOTE — REASON FOR VISIT
[Other: ____] : [unfilled] [FreeTextEntry1] : Diagnostic Tests:\par ----------------------------------------------------\par ECG: \par 03/07/23: sinus rhythm, RBBB. \par 11/03/22: sinus rhythm, RBBB. \par 02/01/22: NSR, RBBB.\par 03/04/21: NSR, RBBB. \par 12/10/19: sinus bradycardia at 59, RBBB, diffuse T-wave abnormality.\par 01/17/19: NSR, RBBB. \par 09/12/18: NSR, RBBB. \par 07/06/17: NSR, RBBB.\par 06/01/16: NSR, RBBB. \par 06/11/15: NSR, RBBB. \par 03/04/13: NSR, normal ECG.\par ----------------------------------------------------\par Echo: \par 09/12/22: at [facility]: EF 70%, mild LVH, aortic sclerosis. \par 03/04/21: EF 69%, normal study, contrast employed. \par 09/10/19: EF 69%, normal echo. \par 09/27/18: EF 60%, normal echo. \par 06/11/15: EF 57%, grade I diastolic dysfunction, mild-moderate PI\par 07/22/08: EF 67%, LVH, trace MR/TR\par -----------------------------------------------------\par Stress: \par 05/05/22: exercise stress echo: EF 65%, normal wall motion, PA pressures, and ECG post 4.6 METs of exercise. \par 10/12/16: exercise stress echo: EF 60%, normal wall motion and PA pressures. \par 11/23/15: exercise stress echo: EF 60%, normal wall motion and PA pressures. \par 09/29/09: exercise stress echo: EF 65%, normal wall motion and ECG post 13 METS\par -----------------------------------------------------\par Carotid:\par 11/23/15: sono; b/l prox ICA 1-19%.\par -----------------------------------------------------\par Lower extremity veins:\par 05/05/22: sono: + chronic nonocclusive DVT rPOP vein, rGSV not visualized (ablated),  left normal. \par 03/04/21: sono: + chronic nonocclusive DVT rPOP vein, rGSV not visualized (ablated),  left normal. \par 12/10/19: sono: + chronic nonocclusive DVT rPOP vein, + chronic nonocclusive SVT rPAV, + right varicose veins, left normal. \par -----------------------------------------------------\par CT:\par 09/12/22: CTA chest/PE: moderate coronary artery calcifications, no PE, large hiatal hernia resulting in compressive atelectasis RLL.

## 2023-06-27 NOTE — HISTORY OF PRESENT ILLNESS
[FreeTextEntry1] : Mr. Lainez presents for follow up and management of CAD, HTN, vasovagal syncope, RBBB, CKD III, hiatal hernia, and dyslipidemia. He has a history of lumbar disc disease and underwent spinal surgery with francie insertion in '99  He had complaints of progressive ROMAN and was evaluated by a pulmonologist at [facility] who ordered a chest CT which revealed a hiatal hernia but no obvious pulmonary parenchymal problem. He was asked to have a work up for a cardiac etiology of his ROMAN. He had an exercise stress echocardiogram on 10/12/16 which revealed an EF of 60% with normal wall motion and PA pressures post 7 METS of exercise but did reveal marked exertional dyspnea.  He was evaluated by a vascular surgeon, Dr. Restrepo, at [facility], and had a left lower extremity sonogram and was prescribed a compression stockings.  He moved out to [place], [place].  On 03/04/21 he had an echocardiogram which revealed an EF of 69% and was a normal study.  On 05/05/22, he had bilateral lower extremity venous sonography which revealed chronic nonocclusive DVT of the right popliteal vein.  He is following with a nephrologist, Tj Verde MD, for CKD stage III.   On 05/05/22, he had an exercise stress echocardiogram which revealed an EF of 65% and normal wall motion, PA pressures, and ECG post 4.6 METs of exercise. On 09/12/22, [place] transit [place] he had a vasovagal syncopal episode.  He had a prodrome of diaphoresis and nausea.  He was taken to [facility] where he had a benign work up.  As part of the work up, on 09/12/22, he had a CTA chest to r/o PE which revealed moderate coronary artery calcifications, no PE, and a large hiatal hernia resulting in compressive atelectasis RLL. Of note, he initiated tamsulosin two weeks prior.  He has a large hiatal hernia and is following with Miguel Villalta MD ([facility], [place]  At present, he feels well. \par

## 2023-06-27 NOTE — ASSESSMENT
[FreeTextEntry1] : 1. HTN: BP at ACC/AHA 2017 guideline target: borderline low:\par             - continue candesartan 16mg po daily \par             - if BP remains at low end of normal with down titrate candesartan \par             - will send for an echocardiogram to rule out hypertensive heart disease \par             - check lab work today \par \par 2. TIA: in 2000, carotid artery stenosis, b/l prox ICA 1-19% (11/29/2015)\par             - continue aggressive medical therapy\par             - will follow with periodic sonograms. \par \par 3. Lumbar degenerative disc disease: Lumbar disc disease: s/p spinal surgery with francie insertion '99\par             - follow up with spine specialist Baron Vannessa MD \par \par 4. Obstructive sleep apnea and asthma: moderate not on CPAP: \par             - patient encouraged to meet with sleep specialist to discuss alternatives to traditional face mask CPAP\par             - discussed with patient importance of compliance with asthma medications. \par \par 5. Mild intermittent asthma, uncomplicated:  \par             - continue montelukast 10mg po qd \par             - continue ProAir HFA, 108 (90 Base) MCG/ACT, 2 puffs as needed, Inhalation, every 4 hrs\par \par 6. Syncope: vasovagal most recent episode 09/12/22: s/p normal work up: likely exacerbated by tamsulosin\par            - discussed with patient preventive strategies including maintaining adequate volume status, avoiding prolonged standing, or skipping meals as well as abortive maneuvers including physical counter pressure \par \par 7. Dyslipidemia: \par             - continue rosuvastatin 40mg po daily\par             - discussed with patient therapeutic lifestyle changes to improve lipid metabolism\par             - check lab work today \par \par 8. DVT s/p spinal surgery in the past, symptomatic varicose veins: s/p 05/05/22: sono: + chronic nonocclusive DVT rPOP vein, rGSV not visualized (ablated),  left normal: \par             - will follow with serial lower extremity venous sonograms \par             - continue preventive measures\par \par 9. CKD III: 1.33, eGFR 56 (12/27/22): \par            - follow up with nephrologist, Tj Verde MD\par            - avoid nephrotoxic agents\par            - check lab work today \par \par 10. CAD: moderate coronary artery calcifications noted on CT chest:\par           - will pursue medical management\par           - will consider sending for a CCTA in the future\par \par 11. Hiatal hernia:\par          - follow up with surgeon, Miguel Villalta MD ([facility], .) ([phone] fax)\par          - pursuing conservative management \par  \par  \par \par \par

## 2023-06-28 LAB
ALBUMIN SERPL ELPH-MCNC: 4.6 G/DL
ALP BLD-CCNC: 78 U/L
ALT SERPL-CCNC: 14 U/L
ANION GAP SERPL CALC-SCNC: 12 MMOL/L
AST SERPL-CCNC: 16 U/L
BILIRUB SERPL-MCNC: 0.8 MG/DL
BUN SERPL-MCNC: 19 MG/DL
CALCIUM SERPL-MCNC: 9.6 MG/DL
CHLORIDE SERPL-SCNC: 102 MMOL/L
CHOLEST SERPL-MCNC: 140 MG/DL
CO2 SERPL-SCNC: 27 MMOL/L
CREAT SERPL-MCNC: 1.43 MG/DL
EGFR: 51 ML/MIN/1.73M2
ESTIMATED AVERAGE GLUCOSE: 108 MG/DL
GLUCOSE SERPL-MCNC: 103 MG/DL
HBA1C MFR BLD HPLC: 5.4 %
HDLC SERPL-MCNC: 65 MG/DL
LDLC SERPL DIRECT ASSAY-MCNC: 56 MG/DL
POTASSIUM SERPL-SCNC: 4.9 MMOL/L
PROT SERPL-MCNC: 7.1 G/DL
SODIUM SERPL-SCNC: 141 MMOL/L
TRIGL SERPL-MCNC: 84 MG/DL
TSH SERPL-ACNC: 2.39 UIU/ML

## 2023-09-20 ENCOUNTER — ESTABLISHED COMPREHENSIVE EXAM (OUTPATIENT)
Dept: URBAN - METROPOLITAN AREA CLINIC 68 | Facility: CLINIC | Age: 73
End: 2023-09-20

## 2023-09-20 DIAGNOSIS — H25.13: ICD-10-CM

## 2023-09-20 PROCEDURE — 92014 COMPRE OPH EXAM EST PT 1/>: CPT

## 2023-09-20 ASSESSMENT — TONOMETRY
OD_IOP_MMHG: 10
OS_IOP_MMHG: 10

## 2023-09-20 ASSESSMENT — KERATOMETRY
OD_K2POWER_DIOPTERS: 47.75
OD_AXISANGLE_DEGREES: 92
OD_AXISANGLE2_DEGREES: 2
OD_K1POWER_DIOPTERS: 45.50
OS_K1POWER_DIOPTERS: 46.00
OS_AXISANGLE2_DEGREES: 167
OS_AXISANGLE_DEGREES: 77
OS_K2POWER_DIOPTERS: 48.00

## 2023-09-20 ASSESSMENT — VISUAL ACUITY
OS_CC: 20/30-1
OD_CC: J2
OD_CC: 20/30
OS_CC: J2

## 2023-10-05 ENCOUNTER — APPOINTMENT (OUTPATIENT)
Dept: HEART AND VASCULAR | Facility: CLINIC | Age: 73
End: 2023-10-05
Payer: MEDICARE

## 2023-10-05 ENCOUNTER — NON-APPOINTMENT (OUTPATIENT)
Age: 73
End: 2023-10-05

## 2023-10-05 VITALS
OXYGEN SATURATION: 98 % | SYSTOLIC BLOOD PRESSURE: 115 MMHG | BODY MASS INDEX: 29.82 KG/M2 | HEIGHT: 71 IN | TEMPERATURE: 98.7 F | DIASTOLIC BLOOD PRESSURE: 80 MMHG | WEIGHT: 213 LBS | HEART RATE: 90 BPM

## 2023-10-05 PROCEDURE — 99214 OFFICE O/P EST MOD 30 MIN: CPT | Mod: 25

## 2023-10-05 PROCEDURE — 93306 TTE W/DOPPLER COMPLETE: CPT | Mod: 59

## 2023-10-05 PROCEDURE — 93000 ELECTROCARDIOGRAM COMPLETE: CPT

## 2023-10-05 RX ORDER — CANDESARTAN CILEXETIL 16 MG/1
16 TABLET ORAL DAILY
Qty: 90 | Refills: 3 | Status: ACTIVE | COMMUNITY
Start: 2022-02-01 | End: 1900-01-01

## 2023-10-05 RX ORDER — ROSUVASTATIN CALCIUM 40 MG/1
40 TABLET, FILM COATED ORAL
Qty: 180 | Refills: 3 | Status: ACTIVE | COMMUNITY
Start: 2019-09-19 | End: 1900-01-01

## 2023-10-05 RX ORDER — TAMSULOSIN HYDROCHLORIDE 0.4 MG/1
0.4 CAPSULE ORAL
Qty: 90 | Refills: 3 | Status: ACTIVE | COMMUNITY
Start: 2022-10-12 | End: 1900-01-01

## 2024-02-10 PROBLEM — R06.09 DOE (DYSPNEA ON EXERTION): Status: ACTIVE | Noted: 2022-02-01

## 2024-02-10 PROBLEM — I82.409 DVT (DEEP VENOUS THROMBOSIS): Status: ACTIVE | Noted: 2019-01-16

## 2024-02-10 PROBLEM — I10 HTN (HYPERTENSION), BENIGN: Status: ACTIVE | Noted: 2019-01-16

## 2024-02-10 PROBLEM — E78.5 DYSLIPIDEMIA: Status: ACTIVE | Noted: 2019-01-16

## 2024-02-10 PROBLEM — I83.899 VARICOSE VEINS WITH SWELLING: Status: ACTIVE | Noted: 2019-01-16

## 2024-02-10 PROBLEM — I25.10 CAD (CORONARY ARTERY DISEASE): Status: ACTIVE | Noted: 2022-11-03

## 2024-02-10 PROBLEM — R55 VASOVAGAL SYNCOPE: Status: ACTIVE | Noted: 2019-01-16

## 2024-02-10 PROBLEM — N18.30 CKD (CHRONIC KIDNEY DISEASE), STAGE III: Status: ACTIVE | Noted: 2021-07-20

## 2024-02-10 PROBLEM — G45.9 TRANSIENT CEREBRAL ISCHEMIC ATTACK: Status: ACTIVE | Noted: 2019-01-16

## 2024-02-10 PROBLEM — I45.10 RIGHT BUNDLE BRANCH BLOCK (RBBB): Status: ACTIVE | Noted: 2019-01-16

## 2024-02-13 ENCOUNTER — APPOINTMENT (OUTPATIENT)
Dept: HEART AND VASCULAR | Facility: CLINIC | Age: 74
End: 2024-02-13
Payer: MEDICARE

## 2024-02-13 VITALS
HEIGHT: 71 IN | DIASTOLIC BLOOD PRESSURE: 91 MMHG | OXYGEN SATURATION: 96 % | HEART RATE: 85 BPM | WEIGHT: 116 LBS | BODY MASS INDEX: 16.24 KG/M2 | SYSTOLIC BLOOD PRESSURE: 136 MMHG | TEMPERATURE: 98.3 F

## 2024-02-13 DIAGNOSIS — I45.10 UNSPECIFIED RIGHT BUNDLE-BRANCH BLOCK: ICD-10-CM

## 2024-02-13 DIAGNOSIS — I25.10 ATHEROSCLEROTIC HEART DISEASE OF NATIVE CORONARY ARTERY W/OUT ANGINA PECTORIS: ICD-10-CM

## 2024-02-13 DIAGNOSIS — I83.899 VARICOSE VEINS OF UNSPECIFIED LOWER EXTREMITY WITH OTHER COMPLICATIONS: ICD-10-CM

## 2024-02-13 DIAGNOSIS — E78.5 HYPERLIPIDEMIA, UNSPECIFIED: ICD-10-CM

## 2024-02-13 DIAGNOSIS — R06.09 OTHER FORMS OF DYSPNEA: ICD-10-CM

## 2024-02-13 DIAGNOSIS — R55 SYNCOPE AND COLLAPSE: ICD-10-CM

## 2024-02-13 DIAGNOSIS — N18.30 CHRONIC KIDNEY DISEASE, STAGE 3 UNSPECIFIED: ICD-10-CM

## 2024-02-13 DIAGNOSIS — I10 ESSENTIAL (PRIMARY) HYPERTENSION: ICD-10-CM

## 2024-02-13 DIAGNOSIS — I82.409 ACUTE EMBOLISM AND THROMBOSIS OF UNSPECIFIED DEEP VEINS OF UNSPECIFIED LOWER EXTREMITY: ICD-10-CM

## 2024-02-13 DIAGNOSIS — G45.9 TRANSIENT CEREBRAL ISCHEMIC ATTACK, UNSPECIFIED: ICD-10-CM

## 2024-02-13 PROCEDURE — 99215 OFFICE O/P EST HI 40 MIN: CPT

## 2024-02-13 NOTE — ASSESSMENT
[FreeTextEntry1] : ======================================================================================= 1. HTN: BP at ACC/AHA 2017 guideline target:              - continue candesartan 16mg po daily              - check lab work today   2. TIA: in 2000, carotid artery stenosis, b/l prox ICA 1-19% (11/29/2015)             - continue aggressive medical therapy             - will follow with periodic sonograms   3. Lumbar degenerative disc disease: Lumbar disc disease: s/p spinal surgery with francie insertion '99             - follow up with spine specialist Baron Vannessa MD   4. Obstructive sleep apnea and asthma: moderate not on CPAP:              - patient encouraged to meet with sleep specialist to discuss alternatives to traditional face mask CPAP             - discussed with patient importance of compliance with asthma medications.   5. Mild intermittent asthma, uncomplicated:               - continue montelukast 10mg po qd              - continue ProAir HFA, 108 (90 Base) MCG/ACT, 2 puffs as needed, Inhalation, every 4 hrs  6. Syncope: vasovagal most recent episode 09/12/22: s/p normal work up: likely exacerbated by tamsulosin            - discussed with patient preventive strategies including maintaining adequate volume status, avoiding prolonged standing, or skipping meals as well as abortive maneuvers including physical counter pressure   7. Dyslipidemia: LDL 56 (06/27/23):             - continue rosuvastatin 40mg po daily             - discussed with patient therapeutic lifestyle changes to improve lipid metabolism             - check lab work today  8. DVT s/p spinal surgery in the past, symptomatic varicose veins: s/p 05/05/22: sono: + chronic nonocclusive DVT rPOP vein, rGSV not visualized (ablated), left normal:              - will follow with serial lower extremity venous sonograms              - continue preventive measures  9. CKD III: 1.43, eGFR 51 (06/27/23):             - follow up with nephrologist, Tj Verde MD            - avoid nephrotoxic agents            - check lab work today  10. CAD: moderate coronary artery calcifications noted on CT chest:           - will pursue medical management           - will consider sending for a CCTA in the future  11. Hiatal hernia:          - follow up with surgeon, Miguel Villalta MD ([facility] [place]) ([phone] fax)          - pursuing conservative management   I spent > 45 minutes of total time on this visit, including time spent face-to-face and non-face-to-face.  During this time, I took a relevant history and examined the patient.  I reviewed relevant portions of the medical record and formulated a differential diagnosis and plan.  I explained the relevant cardiac diagnoses to the patient, as well as the work up and management plan.  I answered all questions related to the patient's cardiac conditions.

## 2024-02-13 NOTE — REASON FOR VISIT
[Other: ____] : [unfilled] [FreeTextEntry1] : ======================================================================================= Diagnostic Tests: ---------------------------------------------------- ECG:  10/05/23: sinus rhythm, RBBB.  03/07/23: sinus rhythm, RBBB.  11/03/22: sinus rhythm, RBBB.  02/01/22: NSR, RBBB. 03/04/21: NSR, RBBB.  12/10/19: sinus bradycardia at 59, RBBB, diffuse T-wave abnormality. 01/17/19: NSR, RBBB.  09/12/18: NSR, RBBB.  07/06/17: NSR, RBBB. 06/01/16: NSR, RBBB.  06/11/15: NSR, RBBB.  03/04/13: NSR, normal ECG. ---------------------------------------------------- Echo:  10/05/23: EF 65%, aortic sclerosis.  09/12/22: at [facility]: EF 70%, mild LVH, aortic sclerosis.  03/04/21: EF 69%, normal study, contrast employed.  09/10/19: EF 69%, normal echo.  09/27/18: EF 60%, normal echo.  06/11/15: EF 57%, grade I diastolic dysfunction, mild-moderate PI 07/22/08: EF 67%, LVH, trace MR/TR ----------------------------------------------------- Stress:  05/05/22: exercise stress echo: EF 65%, normal wall motion, PA pressures, and ECG post 4.6 METs of exercise.  10/12/16: exercise stress echo: EF 60%, normal wall motion and PA pressures.  11/23/15: exercise stress echo: EF 60%, normal wall motion and PA pressures.  09/29/09: exercise stress echo: EF 65%, normal wall motion and ECG post 13 METS ----------------------------------------------------- Carotid: 11/23/15: sono; b/l prox ICA 1-19%. ----------------------------------------------------- Lower extremity veins: 05/05/22: sono: + chronic nonocclusive DVT rPOP vein, rGSV not visualized (ablated),  left normal.  03/04/21: sono: + chronic nonocclusive DVT rPOP vein, rGSV not visualized (ablated),  left normal.  12/10/19: sono: + chronic nonocclusive DVT rPOP vein, + chronic nonocclusive SVT rPAV, + right varicose veins, left normal.  ----------------------------------------------------- CT: 09/12/22: CTA chest/PE: moderate coronary artery calcifications, no PE, large hiatal hernia resulting in compressive atelectasis RLL.

## 2024-02-13 NOTE — PHYSICAL EXAM
[General Appearance - Well Developed] : well developed [Normal Appearance] : normal appearance [Well Groomed] : well groomed [General Appearance - Well Nourished] : well nourished [No Deformities] : no deformities [General Appearance - In No Acute Distress] : no acute distress [Normal Conjunctiva] : the conjunctiva exhibited no abnormalities [Eyelids - No Xanthelasma] : the eyelids demonstrated no xanthelasmas [Normal Oral Mucosa] : normal oral mucosa [No Oral Pallor] : no oral pallor [No Oral Cyanosis] : no oral cyanosis [Normal Jugular Venous A Waves Present] : normal jugular venous A waves present [Normal Jugular Venous V Waves Present] : normal jugular venous V waves present [No Jugular Venous Sultana A Waves] : no jugular venous sultana A waves [Respiration, Rhythm And Depth] : normal respiratory rhythm and effort [Exaggerated Use Of Accessory Muscles For Inspiration] : no accessory muscle use [Auscultation Breath Sounds / Voice Sounds] : lungs were clear to auscultation bilaterally [Abdomen Soft] : soft [Abdomen Tenderness] : non-tender [Abdomen Mass (___ Cm)] : no abdominal mass palpated [Abnormal Walk] : normal gait [Gait - Sufficient For Exercise Testing] : the gait was sufficient for exercise testing [Nail Clubbing] : no clubbing of the fingernails [Cyanosis, Localized] : no localized cyanosis [Petechial Hemorrhages (___cm)] : no petechial hemorrhages [Skin Color & Pigmentation] : normal skin color and pigmentation [] : no rash [No Venous Stasis] : no venous stasis [Skin Lesions] : no skin lesions [No Skin Ulcers] : no skin ulcer [No Xanthoma] : no  xanthoma was observed [Oriented To Time, Place, And Person] : oriented to person, place, and time [Affect] : the affect was normal [Mood] : the mood was normal [No Anxiety] : not feeling anxious [Normal Rate] : normal [Normal S2] : normal S2 [Normal S1] : normal S1 [No Murmur] : no murmurs heard [2+] : left 2+ [No Abnormalities] : the abdominal aorta was not enlarged and no bruit was heard [No Pitting Edema] : no pitting edema present [FreeTextEntry1] : + overweight [S3] : no S3 [S4] : no S4 [Right Carotid Bruit] : no bruit heard over the right carotid [Left Carotid Bruit] : no bruit heard over the left carotid [Right Femoral Bruit] : no bruit heard over the right femoral artery [Left Femoral Bruit] : no bruit heard over the left femoral artery

## 2024-02-13 NOTE — HISTORY OF PRESENT ILLNESS
[FreeTextEntry1] : Mr. Lainez presents for follow up and management of CAD, HTN, vasovagal syncope, RBBB, CKD III, hiatal hernia, and dyslipidemia. He has a history of lumbar disc disease and underwent spinal surgery with francie insertion in '99.  He had complaints of progressive ROMAN and was evaluated by a pulmonologist at [facility] who ordered a chest CT which revealed a hiatal hernia but no obvious pulmonary parenchymal problem. He was asked to have a workup for a cardiac etiology of his ROMAN. He had an exercise stress echocardiogram on 10/12/16 which revealed an EF of 60% with normal wall motion and PA pressures post 7 METS of exercise but did reveal marked exertional dyspnea.  He was evaluated by a vascular surgeon, Dr. Restrepo, at [facility], and had a left lower extremity sonogram and was prescribed a compression stockings.  He moved out to [place], [place].  On 03/04/21 he had an echocardiogram which revealed an EF of 69% and was a normal study.  On 05/05/22, he had bilateral lower extremity venous sonography which revealed chronic nonocclusive DVT of the right popliteal vein.  He is following with a nephrologist, Tj Verde MD, for CKD stage III.   On 05/05/22, he had an exercise stress echocardiogram which revealed an EF of 65% and normal wall motion, PA pressures, and ECG post 4.6 METs of exercise. On 09/12/22, [place] he had a vasovagal syncopal episode.  He had a prodrome of diaphoresis and nausea.  He was taken to [facility] where he had a benign work up.  As part of the work up, on 09/12/22, he had a CTA chest to r/o PE which revealed moderate coronary artery calcifications, no PE, and a large hiatal hernia resulting in compressive atelectasis RLL. Of note, he initiated tamsulosin two weeks prior.  He has a large hiatal hernia and is following with Miguel Villalta MD ([facility] [place]  On 10/05/23, he had an echocardiogram which revealed an EF of 65% and aortic sclerosis.  At present, he feels well.

## 2024-02-14 LAB
ALBUMIN SERPL ELPH-MCNC: 4.3 G/DL
ALP BLD-CCNC: 72 U/L
ALT SERPL-CCNC: 14 U/L
ANION GAP SERPL CALC-SCNC: 15 MMOL/L
AST SERPL-CCNC: 15 U/L
BASOPHILS # BLD AUTO: 0.05 K/UL
BASOPHILS NFR BLD AUTO: 0.7 %
BILIRUB SERPL-MCNC: 0.8 MG/DL
BUN SERPL-MCNC: 18 MG/DL
CALCIUM SERPL-MCNC: 9.3 MG/DL
CHLORIDE SERPL-SCNC: 105 MMOL/L
CHOLEST SERPL-MCNC: 136 MG/DL
CO2 SERPL-SCNC: 24 MMOL/L
CREAT SERPL-MCNC: 1.43 MG/DL
EGFR: 51 ML/MIN/1.73M2
EOSINOPHIL # BLD AUTO: 0.21 K/UL
EOSINOPHIL NFR BLD AUTO: 2.9 %
ESTIMATED AVERAGE GLUCOSE: 120 MG/DL
GLUCOSE SERPL-MCNC: 102 MG/DL
HBA1C MFR BLD HPLC: 5.8 %
HCT VFR BLD CALC: 39.3 %
HDLC SERPL-MCNC: 67 MG/DL
HGB BLD-MCNC: 12.8 G/DL
IMM GRANULOCYTES NFR BLD AUTO: 0.1 %
LDLC SERPL DIRECT ASSAY-MCNC: 54 MG/DL
LYMPHOCYTES # BLD AUTO: 1.14 K/UL
LYMPHOCYTES NFR BLD AUTO: 15.5 %
MAN DIFF?: NORMAL
MCHC RBC-ENTMCNC: 30.5 PG
MCHC RBC-ENTMCNC: 32.6 GM/DL
MCV RBC AUTO: 93.6 FL
MONOCYTES # BLD AUTO: 0.49 K/UL
MONOCYTES NFR BLD AUTO: 6.7 %
NEUTROPHILS # BLD AUTO: 5.46 K/UL
NEUTROPHILS NFR BLD AUTO: 74.1 %
PLATELET # BLD AUTO: 216 K/UL
POTASSIUM SERPL-SCNC: 4.6 MMOL/L
PROT SERPL-MCNC: 6.5 G/DL
PSA SERPL-MCNC: 0.55 NG/ML
RBC # BLD: 4.2 M/UL
RBC # FLD: 13.3 %
SODIUM SERPL-SCNC: 143 MMOL/L
TRIGL SERPL-MCNC: 99 MG/DL
TSH SERPL-ACNC: 2.88 UIU/ML
WBC # FLD AUTO: 7.36 K/UL

## 2024-04-22 ENCOUNTER — RX RENEWAL (OUTPATIENT)
Age: 74
End: 2024-04-22

## 2024-04-22 RX ORDER — MONTELUKAST 10 MG/1
10 TABLET, FILM COATED ORAL
Qty: 90 | Refills: 3 | Status: ACTIVE | COMMUNITY
Start: 2021-06-08 | End: 1900-01-01

## 2024-08-13 ENCOUNTER — NON-APPOINTMENT (OUTPATIENT)
Age: 74
End: 2024-08-13

## 2024-08-13 ENCOUNTER — APPOINTMENT (OUTPATIENT)
Dept: HEART AND VASCULAR | Facility: CLINIC | Age: 74
End: 2024-08-13
Payer: MEDICARE

## 2024-08-13 VITALS
WEIGHT: 209.5 LBS | DIASTOLIC BLOOD PRESSURE: 62 MMHG | BODY MASS INDEX: 29.33 KG/M2 | HEART RATE: 87 BPM | HEIGHT: 71 IN | OXYGEN SATURATION: 97 % | SYSTOLIC BLOOD PRESSURE: 94 MMHG

## 2024-08-13 VITALS — DIASTOLIC BLOOD PRESSURE: 70 MMHG | SYSTOLIC BLOOD PRESSURE: 98 MMHG

## 2024-08-13 DIAGNOSIS — N18.30 CHRONIC KIDNEY DISEASE, STAGE 3 UNSPECIFIED: ICD-10-CM

## 2024-08-13 DIAGNOSIS — G47.30 SLEEP APNEA, UNSPECIFIED: ICD-10-CM

## 2024-08-13 DIAGNOSIS — I45.10 UNSPECIFIED RIGHT BUNDLE-BRANCH BLOCK: ICD-10-CM

## 2024-08-13 DIAGNOSIS — I82.409 ACUTE EMBOLISM AND THROMBOSIS OF UNSPECIFIED DEEP VEINS OF UNSPECIFIED LOWER EXTREMITY: ICD-10-CM

## 2024-08-13 DIAGNOSIS — G45.9 TRANSIENT CEREBRAL ISCHEMIC ATTACK, UNSPECIFIED: ICD-10-CM

## 2024-08-13 DIAGNOSIS — I25.10 ATHEROSCLEROTIC HEART DISEASE OF NATIVE CORONARY ARTERY W/OUT ANGINA PECTORIS: ICD-10-CM

## 2024-08-13 DIAGNOSIS — R55 SYNCOPE AND COLLAPSE: ICD-10-CM

## 2024-08-13 DIAGNOSIS — R06.09 OTHER FORMS OF DYSPNEA: ICD-10-CM

## 2024-08-13 DIAGNOSIS — E78.5 HYPERLIPIDEMIA, UNSPECIFIED: ICD-10-CM

## 2024-08-13 DIAGNOSIS — I10 ESSENTIAL (PRIMARY) HYPERTENSION: ICD-10-CM

## 2024-08-13 DIAGNOSIS — I83.899 VARICOSE VEINS OF UNSPECIFIED LOWER EXTREMITY WITH OTHER COMPLICATIONS: ICD-10-CM

## 2024-08-13 PROCEDURE — 93000 ELECTROCARDIOGRAM COMPLETE: CPT

## 2024-08-13 PROCEDURE — 99215 OFFICE O/P EST HI 40 MIN: CPT

## 2024-08-13 PROCEDURE — G2211 COMPLEX E/M VISIT ADD ON: CPT

## 2024-08-13 NOTE — HISTORY OF PRESENT ILLNESS
[FreeTextEntry1] : Mr. Lainez presents for follow up and management of CAD, HTN, vasovagal syncope, RBBB, CKD III, hiatal hernia, and dyslipidemia. He moved out to [place], [place] after his wife passed away.  On 05/05/22, he had bilateral lower extremity venous sonography which revealed chronic nonocclusive DVT of the right popliteal vein.  He is following with a nephrologist, Tj Verde MD, for CKD stage III.   On 05/05/22, he had an exercise stress echocardiogram which revealed an EF of 65% and normal wall motion, PA pressures, and ECG post 4.6 METs of exercise. On 09/12/22, [place] he had a vasovagal syncopal episode.  He had a prodrome of diaphoresis and nausea.  He was taken to [facility] where he had a benign work up.  As part of the work up, on 09/12/22, he had a CTA chest to r/o PE which revealed moderate coronary artery calcifications, no PE, and a large hiatal hernia resulting in compressive atelectasis RLL. Of note, he initiated tamsulosin two weeks prior.  He has a large hiatal hernia and is following with Miguel Villalta MD ([facility] [place]  On 10/05/23, he had an echocardiogram which revealed an EF of 65% and aortic sclerosis.  At present, he feels well.  At present, he denies chest pain or recurrent syncope and has ROMAN to 1-2 flights of stairs.

## 2024-08-13 NOTE — REASON FOR VISIT
[Other: ____] : [unfilled] [FreeTextEntry1] : ======================================================================================= Diagnostic Tests: ---------------------------------------------------- EC24: sinus rhythm, RBBB.  10/05/23: sinus rhythm, RBBB.  23: sinus rhythm, RBBB.  22: sinus rhythm, RBBB.  22: NSR, RBBB. 21: NSR, RBBB.  12/10/19: sinus bradycardia at 59, RBBB, diffuse T-wave abnormality. 19: NSR, RBBB.  18: NSR, RBBB.  17: NSR, RBBB. 16: NSR, RBBB.  06/11/15: NSR, RBBB.  13: NSR, normal ECG. ---------------------------------------------------- Echo:  10/05/23: EF 65%, aortic sclerosis.  22: at [facility]: EF 70%, mild LVH, aortic sclerosis.  21: EF 69%, normal study, contrast employed.  09/10/19: EF 69%, normal echo.  18: EF 60%, normal echo.  06/11/15: EF 57%, grade I diastolic dysfunction, mild-moderate PI 08: EF 67%, LVH, trace MR/TR ----------------------------------------------------- Stress:  22: exercise stress echo: EF 65%, normal wall motion, PA pressures, and ECG post 4.6 METs of exercise.  10/12/16: exercise stress echo: EF 60%, normal wall motion and PA pressures.  11/23/15: exercise stress echo: EF 60%, normal wall motion and PA pressures.  09: exercise stress echo: EF 65%, normal wall motion and ECG post 13 METS ----------------------------------------------------- Carotid: 11/23/15: sono; b/l prox ICA 1-19%. ----------------------------------------------------- Lower extremity veins: 22: sono: + chronic nonocclusive DVT rPOP vein, rGSV not visualized (ablated),  left normal.  21: sono: + chronic nonocclusive DVT rPOP vein, rGSV not visualized (ablated),  left normal.  12/10/19: sono: + chronic nonocclusive DVT rPOP vein, + chronic nonocclusive SVT rPAV, + right varicose veins, left normal.  ----------------------------------------------------- CT: 22: CTA chest/PE: moderate coronary artery calcifications, no PE, large hiatal hernia resulting in compressive atelectasis RLL.

## 2024-08-13 NOTE — ASSESSMENT
[FreeTextEntry1] : ======================================================================================= 1. HTN: BP at ACC/AHA 2017 guideline target: borderline hypotension:              - decrease candesartan from 16mg po daily to 8mg po qd              - will send for an echocardiogram to assess left ventricular function  2. TIA: in 2000, carotid artery stenosis, b/l prox ICA 1-19% (11/29/2015)             - continue aggressive medical therapy             - will follow with periodic sonograms   3. Lumbar degenerative disc disease: Lumbar disc disease: s/p spinal surgery with francie insertion '99             - follow up with spine specialist Baron Vannessa MD   4. Obstructive sleep apnea and asthma: moderate not on CPAP:              - patient encouraged to meet with sleep specialist to discuss alternatives to traditional face mask CPAP             - discussed with patient importance of compliance with asthma medications.   5. Mild intermittent asthma, uncomplicated:               - continue montelukast 10mg po qd              - continue ProAir HFA, 108 (90 Base) MCG/ACT, 2 puffs as needed, Inhalation, every 4 hrs  6. Syncope: vasovagal most recent episode 09/12/22: s/p normal work up: likely exacerbated by tamsulosin            - discussed with patient preventive strategies including maintaining adequate volume status, avoiding prolonged standing, or skipping meals as well as abortive maneuvers including physical counter pressure   7. Dyslipidemia: LDL 54 (02/13/24):             - continue rosuvastatin 40mg po daily             - discussed with patient therapeutic lifestyle changes to improve lipid metabolism  8. DVT s/p spinal surgery in the past, symptomatic varicose veins: s/p 05/05/22: sono: + chronic nonocclusive DVT rPOP vein, rGSV not visualized (ablated), left normal:              - will follow with serial lower extremity venous sonograms              - continue preventive measures  9. CKD III: 1.43, eGFR 51 (02/13/24):            - follow up with nephrologist, Tj Verde MD            - avoid nephrotoxic agents  10. CAD: moderate coronary artery calcifications noted on CT chest:           - will pursue medical management           - will consider sending for a CCTA in the future  11. Hiatal hernia:          - follow up with surgeon, Miguel Villalta MD ([facility] [place]) ([phone] fax)          - pursuing conservative management   I spent > 45 minutes of total time on this visit, including time spent face-to-face and non-face-to-face.  During this time, I took a relevant history and examined the patient.  I reviewed relevant portions of the medical record and formulated a differential diagnosis and plan.  I explained the relevant cardiac diagnoses to the patient, as well as the work up and management plan.  I answered all questions related to the patient's cardiac conditions.        
Unknown

## 2024-11-01 ENCOUNTER — ESTABLISHED COMPREHENSIVE EXAM (OUTPATIENT)
Dept: URBAN - METROPOLITAN AREA CLINIC 68 | Facility: CLINIC | Age: 74
End: 2024-11-01

## 2024-11-01 DIAGNOSIS — H25.13: ICD-10-CM

## 2024-11-01 PROCEDURE — 92014 COMPRE OPH EXAM EST PT 1/>: CPT

## 2024-11-01 ASSESSMENT — KERATOMETRY
OS_K1POWER_DIOPTERS: 46.50
OD_K2POWER_DIOPTERS: 48.25
OS_K2POWER_DIOPTERS: 48.25
OD_AXISANGLE2_DEGREES: 5
OS_AXISANGLE2_DEGREES: 169
OD_K1POWER_DIOPTERS: 46.00
OD_AXISANGLE_DEGREES: 095
OS_AXISANGLE_DEGREES: 079

## 2024-11-01 ASSESSMENT — VISUAL ACUITY
OD_PH: 20/30
OS_CC: 20/30
OD_GLARE: 20/60
OD_CC: 20/40
OD_CC: J2
OS_CC: J2
OS_GLARE: 20/40

## 2024-11-01 ASSESSMENT — TONOMETRY
OD_IOP_MMHG: 9
OS_IOP_MMHG: 14

## 2024-12-10 ENCOUNTER — NON-APPOINTMENT (OUTPATIENT)
Age: 74
End: 2024-12-10

## 2024-12-10 ENCOUNTER — APPOINTMENT (OUTPATIENT)
Dept: HEART AND VASCULAR | Facility: CLINIC | Age: 74
End: 2024-12-10
Payer: MEDICARE

## 2024-12-10 VITALS
OXYGEN SATURATION: 95 % | BODY MASS INDEX: 29.75 KG/M2 | WEIGHT: 212.5 LBS | HEART RATE: 78 BPM | HEIGHT: 71 IN | SYSTOLIC BLOOD PRESSURE: 117 MMHG | TEMPERATURE: 97.4 F | DIASTOLIC BLOOD PRESSURE: 73 MMHG

## 2024-12-10 DIAGNOSIS — I10 ESSENTIAL (PRIMARY) HYPERTENSION: ICD-10-CM

## 2024-12-10 DIAGNOSIS — G47.30 SLEEP APNEA, UNSPECIFIED: ICD-10-CM

## 2024-12-10 DIAGNOSIS — R06.09 OTHER FORMS OF DYSPNEA: ICD-10-CM

## 2024-12-10 DIAGNOSIS — N18.30 CHRONIC KIDNEY DISEASE, STAGE 3 UNSPECIFIED: ICD-10-CM

## 2024-12-10 DIAGNOSIS — G45.9 TRANSIENT CEREBRAL ISCHEMIC ATTACK, UNSPECIFIED: ICD-10-CM

## 2024-12-10 DIAGNOSIS — R55 SYNCOPE AND COLLAPSE: ICD-10-CM

## 2024-12-10 DIAGNOSIS — I83.899 VARICOSE VEINS OF UNSPECIFIED LOWER EXTREMITY WITH OTHER COMPLICATIONS: ICD-10-CM

## 2024-12-10 DIAGNOSIS — E78.5 HYPERLIPIDEMIA, UNSPECIFIED: ICD-10-CM

## 2024-12-10 DIAGNOSIS — I82.409 ACUTE EMBOLISM AND THROMBOSIS OF UNSPECIFIED DEEP VEINS OF UNSPECIFIED LOWER EXTREMITY: ICD-10-CM

## 2024-12-10 DIAGNOSIS — I25.10 ATHEROSCLEROTIC HEART DISEASE OF NATIVE CORONARY ARTERY W/OUT ANGINA PECTORIS: ICD-10-CM

## 2024-12-10 DIAGNOSIS — I45.10 UNSPECIFIED RIGHT BUNDLE-BRANCH BLOCK: ICD-10-CM

## 2024-12-10 PROCEDURE — 99215 OFFICE O/P EST HI 40 MIN: CPT | Mod: 25

## 2024-12-10 PROCEDURE — 93306 TTE W/DOPPLER COMPLETE: CPT | Mod: 59

## 2025-04-24 ENCOUNTER — NON-APPOINTMENT (OUTPATIENT)
Age: 75
End: 2025-04-24

## 2025-04-24 ENCOUNTER — APPOINTMENT (OUTPATIENT)
Dept: HEART AND VASCULAR | Facility: CLINIC | Age: 75
End: 2025-04-24
Payer: MEDICARE

## 2025-04-24 VITALS
WEIGHT: 212 LBS | TEMPERATURE: 98.5 F | BODY MASS INDEX: 29.68 KG/M2 | OXYGEN SATURATION: 96 % | SYSTOLIC BLOOD PRESSURE: 144 MMHG | DIASTOLIC BLOOD PRESSURE: 92 MMHG | HEIGHT: 71 IN | HEART RATE: 84 BPM

## 2025-04-24 VITALS — DIASTOLIC BLOOD PRESSURE: 81 MMHG | SYSTOLIC BLOOD PRESSURE: 130 MMHG

## 2025-04-24 DIAGNOSIS — R06.09 OTHER FORMS OF DYSPNEA: ICD-10-CM

## 2025-04-24 DIAGNOSIS — I45.10 UNSPECIFIED RIGHT BUNDLE-BRANCH BLOCK: ICD-10-CM

## 2025-04-24 DIAGNOSIS — I10 ESSENTIAL (PRIMARY) HYPERTENSION: ICD-10-CM

## 2025-04-24 DIAGNOSIS — E78.5 HYPERLIPIDEMIA, UNSPECIFIED: ICD-10-CM

## 2025-04-24 DIAGNOSIS — I83.899 VARICOSE VEINS OF UNSPECIFIED LOWER EXTREMITY WITH OTHER COMPLICATIONS: ICD-10-CM

## 2025-04-24 DIAGNOSIS — I82.409 ACUTE EMBOLISM AND THROMBOSIS OF UNSPECIFIED DEEP VEINS OF UNSPECIFIED LOWER EXTREMITY: ICD-10-CM

## 2025-04-24 DIAGNOSIS — R55 SYNCOPE AND COLLAPSE: ICD-10-CM

## 2025-04-24 DIAGNOSIS — I25.10 ATHEROSCLEROTIC HEART DISEASE OF NATIVE CORONARY ARTERY W/OUT ANGINA PECTORIS: ICD-10-CM

## 2025-04-24 DIAGNOSIS — N18.30 CHRONIC KIDNEY DISEASE, STAGE 3 UNSPECIFIED: ICD-10-CM

## 2025-04-24 DIAGNOSIS — G47.30 SLEEP APNEA, UNSPECIFIED: ICD-10-CM

## 2025-04-24 DIAGNOSIS — G45.9 TRANSIENT CEREBRAL ISCHEMIC ATTACK, UNSPECIFIED: ICD-10-CM

## 2025-04-24 PROCEDURE — 99214 OFFICE O/P EST MOD 30 MIN: CPT

## 2025-04-24 PROCEDURE — G2211 COMPLEX E/M VISIT ADD ON: CPT

## 2025-04-25 LAB
ALBUMIN SERPL ELPH-MCNC: 4.2 G/DL
ALP BLD-CCNC: 68 U/L
ALT SERPL-CCNC: 7 U/L
ANION GAP SERPL CALC-SCNC: 13 MMOL/L
AST SERPL-CCNC: 13 U/L
BASOPHILS # BLD AUTO: 0.07 K/UL
BASOPHILS NFR BLD AUTO: 1 %
BILIRUB SERPL-MCNC: 0.7 MG/DL
BUN SERPL-MCNC: 17 MG/DL
CALCIUM SERPL-MCNC: 8.9 MG/DL
CHLORIDE SERPL-SCNC: 104 MMOL/L
CHOLEST SERPL-MCNC: 144 MG/DL
CO2 SERPL-SCNC: 23 MMOL/L
CREAT SERPL-MCNC: 1.37 MG/DL
EGFRCR SERPLBLD CKD-EPI 2021: 54 ML/MIN/1.73M2
EOSINOPHIL # BLD AUTO: 0.2 K/UL
EOSINOPHIL NFR BLD AUTO: 2.9 %
ESTIMATED AVERAGE GLUCOSE: 126 MG/DL
GLUCOSE SERPL-MCNC: 99 MG/DL
HBA1C MFR BLD HPLC: 6 %
HCT VFR BLD CALC: 38.1 %
HDLC SERPL-MCNC: 66 MG/DL
HGB BLD-MCNC: 12 G/DL
IMM GRANULOCYTES NFR BLD AUTO: 0.4 %
LDLC SERPL DIRECT ASSAY-MCNC: 60 MG/DL
LYMPHOCYTES # BLD AUTO: 1.2 K/UL
LYMPHOCYTES NFR BLD AUTO: 17.6 %
MAN DIFF?: NORMAL
MCHC RBC-ENTMCNC: 29.6 PG
MCHC RBC-ENTMCNC: 31.5 G/DL
MCV RBC AUTO: 94.1 FL
MONOCYTES # BLD AUTO: 0.61 K/UL
MONOCYTES NFR BLD AUTO: 8.9 %
NEUTROPHILS # BLD AUTO: 4.72 K/UL
NEUTROPHILS NFR BLD AUTO: 69.2 %
PLATELET # BLD AUTO: 218 K/UL
POTASSIUM SERPL-SCNC: 4.4 MMOL/L
PROT SERPL-MCNC: 6.6 G/DL
RBC # BLD: 4.05 M/UL
RBC # FLD: 13.5 %
SODIUM SERPL-SCNC: 140 MMOL/L
TRIGL SERPL-MCNC: 73 MG/DL
TSH SERPL-ACNC: 2.8 UIU/ML
WBC # FLD AUTO: 6.83 K/UL

## 2025-08-28 ENCOUNTER — APPOINTMENT (OUTPATIENT)
Dept: HEART AND VASCULAR | Facility: CLINIC | Age: 75
End: 2025-08-28

## 2025-08-28 DIAGNOSIS — R06.09 OTHER FORMS OF DYSPNEA: ICD-10-CM

## 2025-08-28 DIAGNOSIS — G47.30 SLEEP APNEA, UNSPECIFIED: ICD-10-CM

## 2025-08-28 DIAGNOSIS — G45.9 TRANSIENT CEREBRAL ISCHEMIC ATTACK, UNSPECIFIED: ICD-10-CM

## 2025-08-28 DIAGNOSIS — I25.10 ATHEROSCLEROTIC HEART DISEASE OF NATIVE CORONARY ARTERY W/OUT ANGINA PECTORIS: ICD-10-CM

## 2025-08-28 DIAGNOSIS — E78.5 HYPERLIPIDEMIA, UNSPECIFIED: ICD-10-CM

## 2025-08-28 DIAGNOSIS — N18.30 CHRONIC KIDNEY DISEASE, STAGE 3 UNSPECIFIED: ICD-10-CM

## 2025-08-28 DIAGNOSIS — I83.899 VARICOSE VEINS OF UNSPECIFIED LOWER EXTREMITY WITH OTHER COMPLICATIONS: ICD-10-CM

## 2025-08-28 DIAGNOSIS — I10 ESSENTIAL (PRIMARY) HYPERTENSION: ICD-10-CM

## 2025-08-28 DIAGNOSIS — I45.10 UNSPECIFIED RIGHT BUNDLE-BRANCH BLOCK: ICD-10-CM

## 2025-08-28 DIAGNOSIS — R55 SYNCOPE AND COLLAPSE: ICD-10-CM

## 2025-08-28 DIAGNOSIS — I82.409 ACUTE EMBOLISM AND THROMBOSIS OF UNSPECIFIED DEEP VEINS OF UNSPECIFIED LOWER EXTREMITY: ICD-10-CM
